# Patient Record
Sex: MALE | Race: ASIAN
[De-identification: names, ages, dates, MRNs, and addresses within clinical notes are randomized per-mention and may not be internally consistent; named-entity substitution may affect disease eponyms.]

---

## 2017-03-07 ENCOUNTER — MYAURORA ACCOUNT LINK (OUTPATIENT)
Dept: OTHER | Age: 77
End: 2017-03-07

## 2017-03-07 ENCOUNTER — PRIOR ORIGINAL RECORDS (OUTPATIENT)
Dept: OTHER | Age: 77
End: 2017-03-07

## 2017-03-21 ENCOUNTER — PRIOR ORIGINAL RECORDS (OUTPATIENT)
Dept: OTHER | Age: 77
End: 2017-03-21

## 2017-03-22 LAB
ALKALINE PHOSPHATATE(ALK PHOS): 54 IU/L
BILIRUBIN TOTAL: 1 MG/DL
BUN: 22 MG/DL
CALCIUM: 9.2 MG/DL
CHLORIDE: 106 MEQ/L
CHOLESTEROL, TOTAL: 117 MG/DL
CREATININE, SERUM: 1.23 MG/DL
GLUCOSE: 111 MG/DL
HDL CHOLESTEROL: 43 MG/DL
HEMATOCRIT: 45.9 %
HEMOGLOBIN A1C: 6.9 %
HEMOGLOBIN: 15.6 G/DL
LDL CHOLESTEROL: 57 MG/DL
PLATELETS: 191 K/UL
POTASSIUM, SERUM: 4.6 MEQ/L
PROTEIN, TOTAL: 6.6 G/DL
RED BLOOD COUNT: 4.64 X 10-6/U
SGOT (AST): 19 IU/L
SGPT (ALT): 17 IU/L
SODIUM: 138 MEQ/L
THYROID STIMULATING HORMONE: 3.52 MLU/L
TRIGLYCERIDES: 87 MG/DL
WHITE BLOOD COUNT: 7.4 X 10-3/U

## 2018-02-13 ENCOUNTER — TELEPHONE (OUTPATIENT)
Dept: FAMILY MEDICINE CLINIC | Facility: CLINIC | Age: 78
End: 2018-02-13

## 2018-02-13 PROBLEM — E78.2 MIXED HYPERLIPIDEMIA: Status: ACTIVE | Noted: 2018-02-13

## 2018-02-13 PROBLEM — I10 ESSENTIAL HYPERTENSION: Status: ACTIVE | Noted: 2018-02-13

## 2018-02-13 PROBLEM — I25.10 ATHEROSCLEROSIS OF NATIVE CORONARY ARTERY OF NATIVE HEART WITHOUT ANGINA PECTORIS: Status: ACTIVE | Noted: 2018-02-13

## 2018-02-14 ENCOUNTER — OFFICE VISIT (OUTPATIENT)
Dept: FAMILY MEDICINE CLINIC | Facility: CLINIC | Age: 78
End: 2018-02-14

## 2018-02-14 VITALS
TEMPERATURE: 97 F | HEART RATE: 60 BPM | HEIGHT: 67 IN | SYSTOLIC BLOOD PRESSURE: 102 MMHG | BODY MASS INDEX: 30.29 KG/M2 | DIASTOLIC BLOOD PRESSURE: 70 MMHG | WEIGHT: 193 LBS | RESPIRATION RATE: 14 BRPM

## 2018-02-14 DIAGNOSIS — R73.9 HYPERGLYCEMIA: ICD-10-CM

## 2018-02-14 DIAGNOSIS — I25.10 ATHEROSCLEROSIS OF NATIVE CORONARY ARTERY OF NATIVE HEART WITHOUT ANGINA PECTORIS: ICD-10-CM

## 2018-02-14 DIAGNOSIS — E78.2 MIXED HYPERLIPIDEMIA: ICD-10-CM

## 2018-02-14 DIAGNOSIS — I10 ESSENTIAL HYPERTENSION: Primary | ICD-10-CM

## 2018-02-14 PROCEDURE — 90732 PPSV23 VACC 2 YRS+ SUBQ/IM: CPT | Performed by: FAMILY MEDICINE

## 2018-02-14 PROCEDURE — 99204 OFFICE O/P NEW MOD 45 MIN: CPT | Performed by: FAMILY MEDICINE

## 2018-02-14 PROCEDURE — 90471 IMMUNIZATION ADMIN: CPT | Performed by: FAMILY MEDICINE

## 2018-02-14 RX ORDER — ATORVASTATIN CALCIUM 10 MG/1
10 TABLET, FILM COATED ORAL NIGHTLY
Qty: 90 TABLET | Refills: 3 | Status: SHIPPED | OUTPATIENT
Start: 2018-02-14 | End: 2019-02-16

## 2018-02-14 RX ORDER — LOSARTAN POTASSIUM 25 MG/1
25 TABLET ORAL DAILY
Qty: 90 TABLET | Refills: 3 | Status: SHIPPED | OUTPATIENT
Start: 2018-02-14 | End: 2019-02-16

## 2018-02-14 NOTE — PROGRESS NOTES
HPI:   Joshua Ramsay is a 69year old male who presents for recheck of his Pre-diabetes. We have been following this elevated Blood sugars and patieint is doing okay. Fasting blood sugars of the last few years are reviewed and mildly elevated.   He is a new Mixed hyperlipidemia; Atherosclerosis of native coronary artery of native heart without angina pectoris; and Hyperglycemia on his problem list.   He  has a past surgical history that includes cabg (2013). His family history is not on file.      He has a c Addressed This Visit        Cardiovascular    Essential hypertension - Primary    Overview     Losartan 25         Current Assessment & Plan     Stable, Continue present management.     Blood Pressure and Cardiac Medications          Losartan Potassium 25 M months  Return in about 6 months (around 8/14/2018) for recheck.     [facility], 2/14/2018, 2:46 PM

## 2018-02-14 NOTE — ASSESSMENT & PLAN NOTE
As for his Pre-Diabetes, it is well controlled, no significant medication side effects noted. Recommendations are: continue present meds, lose weight by increased dietary compliance and exercise and will check labs as ordered.     Lab Results  Component

## 2018-02-20 ENCOUNTER — APPOINTMENT (OUTPATIENT)
Dept: LAB | Age: 78
End: 2018-02-20
Attending: FAMILY MEDICINE
Payer: MEDICAID

## 2018-02-20 ENCOUNTER — PRIOR ORIGINAL RECORDS (OUTPATIENT)
Dept: OTHER | Age: 78
End: 2018-02-20

## 2018-02-20 DIAGNOSIS — I10 ESSENTIAL HYPERTENSION: ICD-10-CM

## 2018-02-20 DIAGNOSIS — E78.2 MIXED HYPERLIPIDEMIA: ICD-10-CM

## 2018-02-20 DIAGNOSIS — R73.9 HYPERGLYCEMIA: ICD-10-CM

## 2018-02-20 LAB
ALBUMIN SERPL-MCNC: 3.9 G/DL (ref 3.5–4.8)
ALP LIVER SERPL-CCNC: 60 U/L (ref 45–117)
ALT SERPL-CCNC: 29 U/L (ref 17–63)
AST SERPL-CCNC: 20 U/L (ref 15–41)
BILIRUB SERPL-MCNC: 1.2 MG/DL (ref 0.1–2)
BUN BLD-MCNC: 20 MG/DL (ref 8–20)
CALCIUM BLD-MCNC: 9.2 MG/DL (ref 8.3–10.3)
CHLORIDE: 108 MMOL/L (ref 101–111)
CHOLEST SMN-MCNC: 118 MG/DL (ref ?–200)
CO2: 24 MMOL/L (ref 22–32)
CREAT BLD-MCNC: 1.07 MG/DL (ref 0.7–1.3)
EST. AVERAGE GLUCOSE BLD GHB EST-MCNC: 151 MG/DL (ref 68–126)
GLUCOSE BLD-MCNC: 108 MG/DL (ref 70–99)
HBA1C MFR BLD HPLC: 6.9 % (ref ?–5.7)
HDLC SERPL-MCNC: 45 MG/DL (ref 45–?)
HDLC SERPL: 2.62 {RATIO} (ref ?–4.97)
LDLC SERPL CALC-MCNC: 59 MG/DL (ref ?–130)
M PROTEIN MFR SERPL ELPH: 7.3 G/DL (ref 6.1–8.3)
NONHDLC SERPL-MCNC: 73 MG/DL (ref ?–130)
POTASSIUM SERPL-SCNC: 4 MMOL/L (ref 3.6–5.1)
SODIUM SERPL-SCNC: 139 MMOL/L (ref 136–144)
TRIGL SERPL-MCNC: 72 MG/DL (ref ?–150)
VLDLC SERPL CALC-MCNC: 14 MG/DL (ref 5–40)

## 2018-02-20 PROCEDURE — 80053 COMPREHEN METABOLIC PANEL: CPT | Performed by: FAMILY MEDICINE

## 2018-02-20 PROCEDURE — 80061 LIPID PANEL: CPT | Performed by: FAMILY MEDICINE

## 2018-02-20 PROCEDURE — 83036 HEMOGLOBIN GLYCOSYLATED A1C: CPT | Performed by: FAMILY MEDICINE

## 2018-02-20 PROCEDURE — 36415 COLL VENOUS BLD VENIPUNCTURE: CPT | Performed by: FAMILY MEDICINE

## 2018-02-21 PROBLEM — E11.9 TYPE 2 DIABETES, DIET CONTROLLED (HCC): Status: ACTIVE | Noted: 2018-02-14

## 2018-02-21 LAB
ALKALINE PHOSPHATATE(ALK PHOS): 60 IU/L
BILIRUBIN TOTAL: 1.2 MG/DL
BUN: 20 MG/DL
CALCIUM: 9.2 MG/DL
CHLORIDE: 108 MEQ/L
CHOLESTEROL, TOTAL: 118 MG/DL
CREATININE, SERUM: 1.07 MG/DL
GLUCOSE: 108 MG/DL
HDL CHOLESTEROL: 45 MG/DL
HEMOGLOBIN A1C: 6.9 %
LDL CHOLESTEROL: 59 MG/DL
POTASSIUM, SERUM: 4 MEQ/L
PROTEIN, TOTAL: 7.3 G/DL
SGOT (AST): 20 IU/L
SGPT (ALT): 29 IU/L
SODIUM: 139 MEQ/L
TRIGLYCERIDES: 72 MG/DL

## 2018-03-02 ENCOUNTER — PRIOR ORIGINAL RECORDS (OUTPATIENT)
Dept: OTHER | Age: 78
End: 2018-03-02

## 2018-03-02 ENCOUNTER — MYAURORA ACCOUNT LINK (OUTPATIENT)
Dept: OTHER | Age: 78
End: 2018-03-02

## 2018-07-30 ENCOUNTER — TELEPHONE (OUTPATIENT)
Dept: FAMILY MEDICINE CLINIC | Facility: CLINIC | Age: 78
End: 2018-07-30

## 2018-07-30 NOTE — TELEPHONE ENCOUNTER
Nellie Rincon from [facility] patient care coordinator is calling to speak to nurse to go over patient's current medications and find out from Dr. Barrie Miller if any other patient care coordinator is needed.

## 2018-11-15 ENCOUNTER — IMMUNIZATION (OUTPATIENT)
Dept: FAMILY MEDICINE CLINIC | Facility: CLINIC | Age: 78
End: 2018-11-15
Payer: MEDICAID

## 2018-11-15 PROCEDURE — 90471 IMMUNIZATION ADMIN: CPT | Performed by: FAMILY MEDICINE

## 2018-11-15 PROCEDURE — 90653 IIV ADJUVANT VACCINE IM: CPT | Performed by: FAMILY MEDICINE

## 2019-02-14 ENCOUNTER — PRIOR ORIGINAL RECORDS (OUTPATIENT)
Dept: OTHER | Age: 79
End: 2019-02-14

## 2019-02-15 ENCOUNTER — LAB ENCOUNTER (OUTPATIENT)
Dept: LAB | Age: 79
End: 2019-02-15
Attending: INTERNAL MEDICINE
Payer: MEDICAID

## 2019-02-15 ENCOUNTER — PRIOR ORIGINAL RECORDS (OUTPATIENT)
Dept: OTHER | Age: 79
End: 2019-02-15

## 2019-02-15 DIAGNOSIS — E78.00 PURE HYPERCHOLESTEROLEMIA: Primary | ICD-10-CM

## 2019-02-15 DIAGNOSIS — E11.9 DM (DIABETES MELLITUS) (HCC): ICD-10-CM

## 2019-02-15 LAB
ALBUMIN SERPL-MCNC: 4 G/DL (ref 3.4–5)
ALBUMIN/GLOB SERPL: 1.1 {RATIO} (ref 1–2)
ALP LIVER SERPL-CCNC: 55 U/L (ref 45–117)
ALT SERPL-CCNC: 24 U/L (ref 16–61)
ANION GAP SERPL CALC-SCNC: 4 MMOL/L (ref 0–18)
AST SERPL-CCNC: 19 U/L (ref 15–37)
BILIRUB SERPL-MCNC: 0.9 MG/DL (ref 0.1–2)
BUN BLD-MCNC: 23 MG/DL (ref 7–18)
BUN/CREAT SERPL: 20.2 (ref 10–20)
CALCIUM BLD-MCNC: 8.9 MG/DL (ref 8.5–10.1)
CHLORIDE SERPL-SCNC: 106 MMOL/L (ref 98–107)
CHOLEST SMN-MCNC: 132 MG/DL (ref ?–200)
CO2 SERPL-SCNC: 30 MMOL/L (ref 21–32)
CREAT BLD-MCNC: 1.14 MG/DL (ref 0.7–1.3)
GLOBULIN PLAS-MCNC: 3.5 G/DL (ref 2.8–4.4)
GLUCOSE BLD-MCNC: 135 MG/DL (ref 70–99)
HDLC SERPL-MCNC: 46 MG/DL (ref 40–59)
LDLC SERPL CALC-MCNC: 66 MG/DL (ref ?–100)
M PROTEIN MFR SERPL ELPH: 7.5 G/DL (ref 6.4–8.2)
NONHDLC SERPL-MCNC: 86 MG/DL (ref ?–130)
OSMOLALITY SERPL CALC.SUM OF ELEC: 296 MOSM/KG (ref 275–295)
POTASSIUM SERPL-SCNC: 4.6 MMOL/L (ref 3.5–5.1)
SODIUM SERPL-SCNC: 140 MMOL/L (ref 136–145)
TRIGL SERPL-MCNC: 100 MG/DL (ref 30–149)
VLDLC SERPL CALC-MCNC: 20 MG/DL (ref 0–30)

## 2019-02-15 PROCEDURE — 80061 LIPID PANEL: CPT

## 2019-02-15 PROCEDURE — 36415 COLL VENOUS BLD VENIPUNCTURE: CPT

## 2019-02-15 PROCEDURE — 80053 COMPREHEN METABOLIC PANEL: CPT

## 2019-02-16 DIAGNOSIS — E78.2 MIXED HYPERLIPIDEMIA: ICD-10-CM

## 2019-02-16 DIAGNOSIS — I10 ESSENTIAL HYPERTENSION: ICD-10-CM

## 2019-02-19 ENCOUNTER — HOSPITAL ENCOUNTER (OUTPATIENT)
Dept: CV DIAGNOSTICS | Facility: HOSPITAL | Age: 79
Discharge: HOME | End: 2019-02-19
Attending: INTERNAL MEDICINE
Payer: MEDICAID

## 2019-02-19 ENCOUNTER — PRIOR ORIGINAL RECORDS (OUTPATIENT)
Dept: OTHER | Age: 79
End: 2019-02-19

## 2019-02-19 DIAGNOSIS — I35.1 AI (AORTIC INSUFFICIENCY): ICD-10-CM

## 2019-02-19 DIAGNOSIS — I25.10 CAD (CORONARY ARTERY DISEASE), NATIVE CORONARY ARTERY: ICD-10-CM

## 2019-02-19 LAB
ALKALINE PHOSPHATATE(ALK PHOS): 55 IU/L
BILIRUBIN TOTAL: 0.9 MG/DL
BUN: 23 MG/DL
CALCIUM: 8.9 MG/DL
CHLORIDE: 106 MEQ/L
CHOLESTEROL, TOTAL: 132 MG/DL
CREATININE, SERUM: 1.14 MG/DL
GLUCOSE: 135 MG/DL
HDL CHOLESTEROL: 46 MG/DL
LDL CHOLESTEROL: 66 MG/DL
POTASSIUM, SERUM: 4.6 MEQ/L
PROTEIN, TOTAL: 7.5 G/DL
SGOT (AST): 19 IU/L
SGPT (ALT): 24 IU/L
SODIUM: 140 MEQ/L
TRIGLYCERIDES: 100 MG/DL

## 2019-02-19 PROCEDURE — 93306 TTE W/DOPPLER COMPLETE: CPT | Performed by: INTERNAL MEDICINE

## 2019-02-20 NOTE — TELEPHONE ENCOUNTER
Refill requests for Atorvastatin and Losartan fail protocol because LOV was 2/14/18 and at that time, pt was advised to follow up in 6 months. No future appointments. Please call pt and assist him in scheduling appt hyperglycemia and HTN.     Routed

## 2019-02-20 NOTE — TELEPHONE ENCOUNTER
Appointment scheduled for 2/25 with Dr. Tatiana Guardado patient needs medication sent to pharmacy. Patient only has about 2days worth of medication.

## 2019-02-21 RX ORDER — LOSARTAN POTASSIUM 25 MG/1
TABLET ORAL
Qty: 90 TABLET | Refills: 0 | Status: SHIPPED | OUTPATIENT
Start: 2019-02-21 | End: 2019-02-25

## 2019-02-21 RX ORDER — ATORVASTATIN CALCIUM 10 MG/1
TABLET, FILM COATED ORAL
Qty: 90 TABLET | Refills: 0 | Status: SHIPPED | OUTPATIENT
Start: 2019-02-21 | End: 2019-02-25

## 2019-02-25 ENCOUNTER — OFFICE VISIT (OUTPATIENT)
Dept: FAMILY MEDICINE CLINIC | Facility: CLINIC | Age: 79
End: 2019-02-25
Payer: MEDICAID

## 2019-02-25 VITALS
DIASTOLIC BLOOD PRESSURE: 70 MMHG | RESPIRATION RATE: 12 BRPM | SYSTOLIC BLOOD PRESSURE: 118 MMHG | TEMPERATURE: 97 F | WEIGHT: 199 LBS | BODY MASS INDEX: 31.23 KG/M2 | HEIGHT: 67 IN | HEART RATE: 56 BPM

## 2019-02-25 DIAGNOSIS — I77.810 DILATATION OF THORACIC AORTA (HCC): ICD-10-CM

## 2019-02-25 DIAGNOSIS — E78.2 MIXED HYPERLIPIDEMIA: ICD-10-CM

## 2019-02-25 DIAGNOSIS — E11.9 TYPE 2 DIABETES, DIET CONTROLLED (HCC): ICD-10-CM

## 2019-02-25 DIAGNOSIS — I10 ESSENTIAL HYPERTENSION: Primary | ICD-10-CM

## 2019-02-25 LAB
CARTRIDGE LOT#: ABNORMAL NUMERIC
CREAT UR-SCNC: 69 MG/DL
HEMOGLOBIN A1C: 8 % (ref 4.3–5.6)
MICROALBUMIN UR-MCNC: 1.24 MG/DL
MICROALBUMIN/CREAT 24H UR-RTO: 18 UG/MG (ref ?–30)

## 2019-02-25 PROCEDURE — 82043 UR ALBUMIN QUANTITATIVE: CPT | Performed by: FAMILY MEDICINE

## 2019-02-25 PROCEDURE — 82570 ASSAY OF URINE CREATININE: CPT | Performed by: FAMILY MEDICINE

## 2019-02-25 PROCEDURE — 83036 HEMOGLOBIN GLYCOSYLATED A1C: CPT | Performed by: FAMILY MEDICINE

## 2019-02-25 PROCEDURE — 99214 OFFICE O/P EST MOD 30 MIN: CPT | Performed by: FAMILY MEDICINE

## 2019-02-25 RX ORDER — LOSARTAN POTASSIUM 25 MG/1
25 TABLET ORAL DAILY
Qty: 90 TABLET | Refills: 4 | Status: SHIPPED | OUTPATIENT
Start: 2019-02-25 | End: 2019-02-25

## 2019-02-25 RX ORDER — BLOOD SUGAR DIAGNOSTIC
STRIP MISCELLANEOUS
Qty: 100 STRIP | Refills: 4 | Status: SHIPPED | OUTPATIENT
Start: 2019-02-25 | End: 2019-08-29

## 2019-02-25 RX ORDER — ATORVASTATIN CALCIUM 10 MG/1
10 TABLET, FILM COATED ORAL DAILY
Qty: 90 TABLET | Refills: 4 | Status: SHIPPED | OUTPATIENT
Start: 2019-02-25 | End: 2020-02-28

## 2019-02-25 RX ORDER — ATORVASTATIN CALCIUM 10 MG/1
10 TABLET, FILM COATED ORAL DAILY
Qty: 90 TABLET | Refills: 4 | Status: SHIPPED | OUTPATIENT
Start: 2019-02-25 | End: 2019-02-25

## 2019-02-25 RX ORDER — LANCETS 30 GAUGE
EACH MISCELLANEOUS
Qty: 100 EACH | Refills: 4 | Status: SHIPPED | OUTPATIENT
Start: 2019-02-25 | End: 2020-08-28

## 2019-02-25 RX ORDER — BLOOD-GLUCOSE METER
1 EACH MISCELLANEOUS 2 TIMES DAILY
Qty: 1 KIT | Refills: 0 | COMMUNITY
Start: 2019-02-25 | End: 2019-08-29

## 2019-02-25 RX ORDER — LOSARTAN POTASSIUM 25 MG/1
25 TABLET ORAL DAILY
Qty: 90 TABLET | Refills: 4 | Status: SHIPPED | OUTPATIENT
Start: 2019-02-25 | End: 2020-02-28

## 2019-02-25 NOTE — PATIENT INSTRUCTIONS
[facility] Lab Encounter on 02/15/2019   Component Date Value Ref Range Status   • Glucose 02/15/2019 135* 70 - 99 mg/dL Final   • Sodium 02/15/2019 140  136 - 145 mmol/L Final   • Potassium 02/15/2019 4.6  3.5 - 5.1 mmol/L Final   • Chloride 02/15/2019 106  98 - HDL Chol 02/15/2019 86  <130 mg/dL Final      Desirable  <130 mg/dL   Borderline  130-159 mg/dL   High        160-189 mg/dL       Very high >=190 mg/dL

## 2019-02-25 NOTE — PROGRESS NOTES
HPI:   Patient presents with:  Blood Pressure    Carlos Esquivel is a 69year old male who presents for recheck of his diabetes.   Subjective    No revcent A1c, done to day and elevated at 8.1% with no nmeds  Lab Results   Component Value Date    A1C 6.9 (H) 02 Dorsalis pedis pulses are 2+ on the right side, and 2+ on the left side. Posterior tibial pulses are 2+ on the right side, and 2+ on the left side. Edema not present.   Pulmonary/Chest: Effort normal and breath sounds normal. No respiratory dist Assessment & Plan     Stable Continue present management.              Endocrine    Type 2 diabetes, diet controlled ([facility] [address]    Overview     Fasting blood sugar in the 1/21/1940 range with an A1c of 8.1% in March 2013 before CABG, A1c 6.9% 2/2017, no meds

## 2019-02-26 NOTE — ASSESSMENT & PLAN NOTE
As for his Diabetes, it is borderline controlled. Recommendations are: continue present meds, lose weight by increased dietary compliance and exercise and will check labs as ordered.   Therapuetic Lifestyle Change and start checking sugars, follow up in

## 2019-02-28 VITALS
HEART RATE: 58 BPM | HEIGHT: 68 IN | WEIGHT: 195 LBS | DIASTOLIC BLOOD PRESSURE: 80 MMHG | BODY MASS INDEX: 29.55 KG/M2 | SYSTOLIC BLOOD PRESSURE: 146 MMHG

## 2019-02-28 VITALS
BODY MASS INDEX: 29.98 KG/M2 | HEART RATE: 66 BPM | WEIGHT: 191 LBS | SYSTOLIC BLOOD PRESSURE: 110 MMHG | DIASTOLIC BLOOD PRESSURE: 70 MMHG | HEIGHT: 67 IN

## 2019-03-01 VITALS
DIASTOLIC BLOOD PRESSURE: 70 MMHG | WEIGHT: 199 LBS | SYSTOLIC BLOOD PRESSURE: 120 MMHG | HEART RATE: 78 BPM | BODY MASS INDEX: 31.23 KG/M2 | HEIGHT: 67 IN

## 2019-03-12 ENCOUNTER — TELEPHONE (OUTPATIENT)
Dept: FAMILY MEDICINE CLINIC | Facility: CLINIC | Age: 79
End: 2019-03-12

## 2019-03-12 NOTE — TELEPHONE ENCOUNTER
Called and talked to daughter the insurance company won't pay for the meter or the strips unless she is on a diabetic medication I suggested they try the $9 meter and 17 cent strips at [address] [facility].  She will look into this

## 2019-03-12 NOTE — TELEPHONE ENCOUNTER
Daughter states that pt prescribed Test strips and Lancets but Insurance won't cover because pt is not taking any Diabetes medication. Can Dr prescribe?  Carmen

## 2019-04-15 ENCOUNTER — TELEPHONE (OUTPATIENT)
Dept: FAMILY MEDICINE CLINIC | Facility: CLINIC | Age: 79
End: 2019-04-15

## 2019-04-15 NOTE — TELEPHONE ENCOUNTER
Received call from Jerie Kussmaul at [facility] checking on strips, states currently [facility] is covering the [facility] strips. Patient went to pharmacy and showing the strips sent in were not covered. [facility] called to verify strips.

## 2019-04-15 NOTE — TELEPHONE ENCOUNTER
Called Ayde Guevara [facility] to call back we did send in one touch strips for the patient so I do not understand the problem

## 2019-04-17 NOTE — TELEPHONE ENCOUNTER
Called and talked to Jerie Kussmaul explained what we ordered but patient is not on any diabetic meds diet controled will need prior Auth to get this approved

## 2019-04-23 RX ORDER — ASPIRIN 325 MG
TABLET ORAL
COMMUNITY

## 2019-04-23 RX ORDER — LOSARTAN POTASSIUM 25 MG/1
TABLET ORAL
COMMUNITY

## 2019-04-23 RX ORDER — ATORVASTATIN CALCIUM 10 MG/1
TABLET, FILM COATED ORAL
COMMUNITY

## 2019-05-20 DIAGNOSIS — I10 ESSENTIAL HYPERTENSION: ICD-10-CM

## 2019-05-20 DIAGNOSIS — E78.2 MIXED HYPERLIPIDEMIA: ICD-10-CM

## 2019-05-21 RX ORDER — ATORVASTATIN CALCIUM 10 MG/1
TABLET, FILM COATED ORAL
Qty: 90 TABLET | Refills: 1 | Status: SHIPPED | OUTPATIENT
Start: 2019-05-21 | End: 2020-02-28

## 2019-05-21 RX ORDER — LOSARTAN POTASSIUM 25 MG/1
TABLET ORAL
Qty: 90 TABLET | Refills: 1 | Status: SHIPPED | OUTPATIENT
Start: 2019-05-21 | End: 2020-02-28

## 2019-05-21 NOTE — TELEPHONE ENCOUNTER
Medication refilled per protocol.      Requested Prescriptions     Signed Prescriptions Disp Refills   • ATORVASTATIN 10 MG Oral Tab 90 tablet 1     Sig: TAKE 1 TABLET(10 MG) BY MOUTH EVERY NIGHT     Authorizing Provider: Francesca Meade     Ordering User: LAUREN

## 2019-08-29 ENCOUNTER — OFFICE VISIT (OUTPATIENT)
Dept: FAMILY MEDICINE CLINIC | Facility: CLINIC | Age: 79
End: 2019-08-29
Payer: MEDICAID

## 2019-08-29 VITALS
HEART RATE: 56 BPM | RESPIRATION RATE: 12 BRPM | TEMPERATURE: 97 F | DIASTOLIC BLOOD PRESSURE: 72 MMHG | WEIGHT: 196.81 LBS | SYSTOLIC BLOOD PRESSURE: 130 MMHG | HEIGHT: 67.5 IN | BODY MASS INDEX: 30.53 KG/M2

## 2019-08-29 DIAGNOSIS — I25.10 ATHEROSCLEROSIS OF NATIVE CORONARY ARTERY OF NATIVE HEART WITHOUT ANGINA PECTORIS: ICD-10-CM

## 2019-08-29 DIAGNOSIS — E11.65 TYPE 2 DIABETES MELLITUS WITH HYPERGLYCEMIA, WITHOUT LONG-TERM CURRENT USE OF INSULIN (HCC): Primary | ICD-10-CM

## 2019-08-29 DIAGNOSIS — I10 ESSENTIAL HYPERTENSION: ICD-10-CM

## 2019-08-29 DIAGNOSIS — E78.2 MIXED HYPERLIPIDEMIA: ICD-10-CM

## 2019-08-29 PROBLEM — E08.65 DIABETES MELLITUS DUE TO UNDERLYING CONDITION WITH HYPERGLYCEMIA (HCC): Status: ACTIVE | Noted: 2018-02-14

## 2019-08-29 LAB
CARTRIDGE LOT#: 989 NUMERIC
HEMOGLOBIN A1C: 8 % (ref 4.3–5.6)

## 2019-08-29 PROCEDURE — 83036 HEMOGLOBIN GLYCOSYLATED A1C: CPT | Performed by: FAMILY MEDICINE

## 2019-08-29 PROCEDURE — 99214 OFFICE O/P EST MOD 30 MIN: CPT | Performed by: FAMILY MEDICINE

## 2019-08-29 RX ORDER — BLOOD-GLUCOSE METER
1 EACH MISCELLANEOUS 2 TIMES DAILY
Qty: 1 KIT | Refills: 0 | Status: SHIPPED | OUTPATIENT
Start: 2019-08-29 | End: 2020-08-28

## 2019-08-29 RX ORDER — BLOOD SUGAR DIAGNOSTIC
STRIP MISCELLANEOUS
Qty: 300 STRIP | Refills: 3 | Status: SHIPPED | OUTPATIENT
Start: 2019-08-29 | End: 2020-08-28

## 2019-08-29 RX ORDER — METFORMIN HYDROCHLORIDE 500 MG/1
1000 TABLET, EXTENDED RELEASE ORAL
Qty: 180 TABLET | Refills: 3 | Status: SHIPPED | OUTPATIENT
Start: 2019-08-29 | End: 2020-02-28

## 2019-08-29 NOTE — PROGRESS NOTES
HPI:   Patient presents with:  Hypertension    Cheryl Washington is a 69year old male who presents for recheck of his diabetes. Subjective    Overall doing well but diabetes is unchanged since last time and still elevated with an A1c above 8.   He is been relat Cardiovascular: Normal rate, regular rhythm, S1 normal, S2 normal and normal heart sounds. No murmur heard. Pulses:       Dorsalis pedis pulses are 2+ on the right side, and 2+ on the left side.         Posterior tibial pulses are 2+ on the right side Atorvastatin 10         Current Assessment & Plan     Stable, Continue present management.     Cholesterol Lowering Medications          ATORVASTATIN 10 MG Oral Tab    atorvastatin 10 MG Oral Tab                    Endocrine    Type 2 diabetes mellitus with

## 2019-08-29 NOTE — ASSESSMENT & PLAN NOTE
Stable, Continue present management.     Cholesterol Lowering Medications          ATORVASTATIN 10 MG Oral Tab    atorvastatin 10 MG Oral Tab

## 2019-08-29 NOTE — ASSESSMENT & PLAN NOTE
Stable, Continue present management.     Blood Pressure and Cardiac Medications          LOSARTAN POTASSIUM 25 MG Oral Tab    Losartan Potassium 25 MG Oral Tab

## 2019-09-03 ENCOUNTER — TELEPHONE (OUTPATIENT)
Dept: FAMILY MEDICINE CLINIC | Facility: CLINIC | Age: 79
End: 2019-09-03

## 2019-09-03 DIAGNOSIS — E11.65 TYPE 2 DIABETES MELLITUS WITH HYPERGLYCEMIA, WITHOUT LONG-TERM CURRENT USE OF INSULIN (HCC): Primary | ICD-10-CM

## 2019-09-05 ENCOUNTER — TELEPHONE (OUTPATIENT)
Dept: FAMILY MEDICINE CLINIC | Facility: CLINIC | Age: 79
End: 2019-09-05

## 2019-09-05 DIAGNOSIS — E11.65 TYPE 2 DIABETES MELLITUS WITH HYPERGLYCEMIA, WITHOUT LONG-TERM CURRENT USE OF INSULIN (HCC): Primary | ICD-10-CM

## 2019-09-05 RX ORDER — BLOOD-GLUCOSE METER
EACH MISCELLANEOUS
Qty: 1 KIT | Refills: 0 | Status: SHIPPED | OUTPATIENT
Start: 2019-09-05 | End: 2020-08-28

## 2019-09-05 NOTE — TELEPHONE ENCOUNTER
Received call from [address] [facility] states received the lancets but also needs the device Onetouch Belica plus, did not receive order.

## 2019-09-10 ENCOUNTER — TELEPHONE (OUTPATIENT)
Dept: FAMILY MEDICINE CLINIC | Facility: CLINIC | Age: 79
End: 2019-09-10

## 2019-09-10 NOTE — TELEPHONE ENCOUNTER
[facility] pharmacy called requesting order sent to them for device.  Pharmacy only received lancets but no device

## 2019-09-11 RX ORDER — LANCING DEVICE/LANCETS
KIT MISCELLANEOUS
Qty: 1 EACH | Refills: 0 | Status: SHIPPED | OUTPATIENT
Start: 2019-09-11

## 2019-09-25 ENCOUNTER — TELEPHONE (OUTPATIENT)
Dept: CARDIOLOGY | Age: 79
End: 2019-09-25

## 2020-02-28 ENCOUNTER — OFFICE VISIT (OUTPATIENT)
Dept: FAMILY MEDICINE CLINIC | Facility: CLINIC | Age: 80
End: 2020-02-28
Payer: MEDICAID

## 2020-02-28 VITALS
SYSTOLIC BLOOD PRESSURE: 120 MMHG | TEMPERATURE: 98 F | BODY MASS INDEX: 30.53 KG/M2 | DIASTOLIC BLOOD PRESSURE: 70 MMHG | WEIGHT: 196.81 LBS | HEART RATE: 56 BPM | RESPIRATION RATE: 12 BRPM | HEIGHT: 67.5 IN

## 2020-02-28 DIAGNOSIS — Z00.00 ANNUAL PHYSICAL EXAM: ICD-10-CM

## 2020-02-28 DIAGNOSIS — I10 ESSENTIAL HYPERTENSION: ICD-10-CM

## 2020-02-28 DIAGNOSIS — E11.65 TYPE 2 DIABETES MELLITUS WITH HYPERGLYCEMIA, WITHOUT LONG-TERM CURRENT USE OF INSULIN (HCC): Primary | ICD-10-CM

## 2020-02-28 DIAGNOSIS — E78.2 MIXED HYPERLIPIDEMIA: ICD-10-CM

## 2020-02-28 DIAGNOSIS — I77.810 DILATATION OF THORACIC AORTA (HCC): ICD-10-CM

## 2020-02-28 DIAGNOSIS — I25.10 ATHEROSCLEROSIS OF NATIVE CORONARY ARTERY OF NATIVE HEART WITHOUT ANGINA PECTORIS: ICD-10-CM

## 2020-02-28 LAB
CARTRIDGE LOT#: 564 NUMERIC
HEMOGLOBIN A1C: 8 % (ref 4.3–5.6)

## 2020-02-28 PROCEDURE — 99397 PER PM REEVAL EST PAT 65+ YR: CPT | Performed by: FAMILY MEDICINE

## 2020-02-28 PROCEDURE — 83036 HEMOGLOBIN GLYCOSYLATED A1C: CPT | Performed by: FAMILY MEDICINE

## 2020-02-28 RX ORDER — ATORVASTATIN CALCIUM 10 MG/1
10 TABLET, FILM COATED ORAL DAILY
Qty: 90 TABLET | Refills: 4 | Status: SHIPPED | OUTPATIENT
Start: 2020-02-28 | End: 2020-06-04

## 2020-02-28 RX ORDER — LOSARTAN POTASSIUM 25 MG/1
25 TABLET ORAL DAILY
Qty: 90 TABLET | Refills: 4 | Status: SHIPPED | OUTPATIENT
Start: 2020-02-28 | End: 2020-06-04

## 2020-02-28 RX ORDER — METFORMIN HYDROCHLORIDE 500 MG/1
500 TABLET, EXTENDED RELEASE ORAL
Qty: 90 TABLET | Refills: 3 | Status: SHIPPED | OUTPATIENT
Start: 2020-02-28 | End: 2021-06-09

## 2020-02-28 RX ORDER — GLIPIZIDE 5 MG/1
5 TABLET, FILM COATED, EXTENDED RELEASE ORAL DAILY
Qty: 90 TABLET | Refills: 1 | Status: SHIPPED | OUTPATIENT
Start: 2020-02-28 | End: 2020-08-28

## 2020-03-25 ENCOUNTER — TELEPHONE (OUTPATIENT)
Dept: CARDIOLOGY | Age: 80
End: 2020-03-25

## 2020-03-30 PROBLEM — Z95.1 HX OF CABG: Status: ACTIVE | Noted: 2018-03-02

## 2020-03-30 PROBLEM — I35.1 AORTIC INSUFFICIENCY: Status: ACTIVE | Noted: 2018-03-02

## 2020-06-04 DIAGNOSIS — I10 ESSENTIAL HYPERTENSION: ICD-10-CM

## 2020-06-04 DIAGNOSIS — E78.2 MIXED HYPERLIPIDEMIA: ICD-10-CM

## 2020-06-04 RX ORDER — ATORVASTATIN CALCIUM 10 MG/1
TABLET, FILM COATED ORAL
Qty: 90 TABLET | Refills: 4 | Status: SHIPPED | OUTPATIENT
Start: 2020-06-04 | End: 2021-10-30

## 2020-06-04 RX ORDER — LOSARTAN POTASSIUM 25 MG/1
TABLET ORAL
Qty: 90 TABLET | Refills: 4 | Status: SHIPPED | OUTPATIENT
Start: 2020-06-04 | End: 2021-10-30

## 2020-06-25 ENCOUNTER — TELEPHONE (OUTPATIENT)
Dept: CARDIOLOGY | Age: 80
End: 2020-06-25

## 2020-08-27 PROBLEM — I35.1 AORTIC INSUFFICIENCY: Status: ACTIVE | Noted: 2018-03-02

## 2020-08-27 PROBLEM — Z95.1 HX OF CABG: Status: ACTIVE | Noted: 2018-03-02

## 2020-08-28 ENCOUNTER — OFFICE VISIT (OUTPATIENT)
Dept: FAMILY MEDICINE CLINIC | Facility: CLINIC | Age: 80
End: 2020-08-28
Payer: MEDICAID

## 2020-08-28 VITALS
TEMPERATURE: 98 F | HEIGHT: 67 IN | WEIGHT: 200 LBS | RESPIRATION RATE: 16 BRPM | SYSTOLIC BLOOD PRESSURE: 130 MMHG | DIASTOLIC BLOOD PRESSURE: 74 MMHG | HEART RATE: 64 BPM | BODY MASS INDEX: 31.39 KG/M2

## 2020-08-28 DIAGNOSIS — I35.1 NONRHEUMATIC AORTIC VALVE INSUFFICIENCY: ICD-10-CM

## 2020-08-28 DIAGNOSIS — I25.10 ATHEROSCLEROSIS OF NATIVE CORONARY ARTERY OF NATIVE HEART WITHOUT ANGINA PECTORIS: ICD-10-CM

## 2020-08-28 DIAGNOSIS — E11.65 TYPE 2 DIABETES MELLITUS WITH HYPERGLYCEMIA, WITHOUT LONG-TERM CURRENT USE OF INSULIN (HCC): ICD-10-CM

## 2020-08-28 DIAGNOSIS — I77.810 DILATATION OF THORACIC AORTA (HCC): ICD-10-CM

## 2020-08-28 DIAGNOSIS — I10 ESSENTIAL HYPERTENSION: ICD-10-CM

## 2020-08-28 DIAGNOSIS — Z00.00 ANNUAL PHYSICAL EXAM: Primary | ICD-10-CM

## 2020-08-28 DIAGNOSIS — E78.2 MIXED HYPERLIPIDEMIA: ICD-10-CM

## 2020-08-28 PROCEDURE — 3075F SYST BP GE 130 - 139MM HG: CPT | Performed by: FAMILY MEDICINE

## 2020-08-28 PROCEDURE — 3008F BODY MASS INDEX DOCD: CPT | Performed by: FAMILY MEDICINE

## 2020-08-28 PROCEDURE — 3078F DIAST BP <80 MM HG: CPT | Performed by: FAMILY MEDICINE

## 2020-08-28 PROCEDURE — 99397 PER PM REEVAL EST PAT 65+ YR: CPT | Performed by: FAMILY MEDICINE

## 2020-08-28 RX ORDER — GLIPIZIDE 5 MG/1
5 TABLET, FILM COATED, EXTENDED RELEASE ORAL DAILY
Qty: 90 TABLET | Refills: 1 | Status: SHIPPED | OUTPATIENT
Start: 2020-08-28 | End: 2021-03-01

## 2020-08-28 NOTE — ASSESSMENT & PLAN NOTE
As for his Diabetes, it is no significant medication side effects noted, borderline controlled. A1c today  Recommendations are: continue present meds, lose weight by increased dietary compliance and exercise and will check labs as ordered.     Lab Results

## 2020-08-28 NOTE — PATIENT INSTRUCTIONS
You can schedule this by calling Central Scheduling at [phone] or visit the online scheduling site at [email or web address]

## 2020-08-28 NOTE — PROGRESS NOTES
Corina Alicea is a 67year old male who presents for a complete physical exam.   HPI:   Patient presents with:  Physical      Annual Physical due on 02/28/2021      Pt complains of doing well. Stable sugars. No new issues. -120  .  Last A1c value was 02/15/2019 08:28 AM    BILT 0.9 02/15/2019 08:28 AM    TSH 3.690 03/12/2013 06:50 AM       Lab Results   Component Value Date/Time    CHOLEST 132 02/15/2019 08:28 AM    HDL 46 02/15/2019 08:28 AM    TRIG 100 02/15/2019 08:28 AM    LDL 66 02/15/2019 08:28 A completed. Pertinent positives and negatives noted in the the HPI.  Specifically:  GEN:  No fever or fatigue  HEAD:  No headaches  EYES:  No vision change  EARS:  No hearing loss  MOUTH/THROAT:  No sore throat or dental problems  HEART:  No chest pain or p tenderness. Abdominal: Soft. Bowel sounds are normal. He exhibits no distension. There is no hepatosplenomegaly. There is no tenderness. There is no rebound and no guarding. No hernia. Musculoskeletal: Normal range of motion.    Lymphadenopathy:     He ha Overview     Dr Bernice Hannah at [address], [address]     Stable continue present management          Atherosclerosis of native coronary artery of native heart without angina pectoris    Overview     [facility] managing, hx CABG. follow. Working on better Diabetes Mellitus control   Return in about 6 months (around 2/28/2021) for recheck.     Nisha Patino MD, 8/28/2020, 2:32 PM

## 2020-08-28 NOTE — ASSESSMENT & PLAN NOTE
Stable, Continue present management.     Blood Pressure and Cardiac Medications          LOSARTAN POTASSIUM 25 MG Oral Tab

## 2020-09-08 ENCOUNTER — LAB ENCOUNTER (OUTPATIENT)
Dept: LAB | Age: 80
End: 2020-09-08
Attending: FAMILY MEDICINE
Payer: MEDICAID

## 2020-09-08 DIAGNOSIS — E11.65 TYPE 2 DIABETES MELLITUS WITH HYPERGLYCEMIA, WITHOUT LONG-TERM CURRENT USE OF INSULIN (HCC): ICD-10-CM

## 2020-09-08 LAB
ALBUMIN SERPL-MCNC: 3.8 G/DL (ref 3.4–5)
ALBUMIN/GLOB SERPL: 1 {RATIO} (ref 1–2)
ALP LIVER SERPL-CCNC: 62 U/L (ref 45–117)
ALT SERPL-CCNC: 28 U/L (ref 16–61)
ANION GAP SERPL CALC-SCNC: 2 MMOL/L (ref 0–18)
AST SERPL-CCNC: 22 U/L (ref 15–37)
BILIRUB SERPL-MCNC: 1.1 MG/DL (ref 0.1–2)
BUN BLD-MCNC: 20 MG/DL (ref 7–18)
BUN/CREAT SERPL: 15.5 (ref 10–20)
CALCIUM BLD-MCNC: 9.6 MG/DL (ref 8.5–10.1)
CHLORIDE SERPL-SCNC: 107 MMOL/L (ref 98–112)
CHOLEST SMN-MCNC: 113 MG/DL (ref ?–200)
CO2 SERPL-SCNC: 29 MMOL/L (ref 21–32)
CREAT BLD-MCNC: 1.29 MG/DL (ref 0.7–1.3)
CREAT UR-SCNC: 113 MG/DL
EST. AVERAGE GLUCOSE BLD GHB EST-MCNC: 166 MG/DL (ref 68–126)
GLOBULIN PLAS-MCNC: 3.8 G/DL (ref 2.8–4.4)
GLUCOSE BLD-MCNC: 103 MG/DL (ref 70–99)
HBA1C MFR BLD HPLC: 7.4 % (ref ?–5.7)
HDLC SERPL-MCNC: 42 MG/DL (ref 40–59)
LDLC SERPL CALC-MCNC: 47 MG/DL (ref ?–100)
M PROTEIN MFR SERPL ELPH: 7.6 G/DL (ref 6.4–8.2)
MICROALBUMIN UR-MCNC: 1.8 MG/DL
MICROALBUMIN/CREAT 24H UR-RTO: 15.9 UG/MG (ref ?–30)
NONHDLC SERPL-MCNC: 71 MG/DL (ref ?–130)
OSMOLALITY SERPL CALC.SUM OF ELEC: 289 MOSM/KG (ref 275–295)
PATIENT FASTING Y/N/NP: YES
PATIENT FASTING Y/N/NP: YES
POTASSIUM SERPL-SCNC: 4.7 MMOL/L (ref 3.5–5.1)
SODIUM SERPL-SCNC: 138 MMOL/L (ref 136–145)
TRIGL SERPL-MCNC: 119 MG/DL (ref 30–149)
VLDLC SERPL CALC-MCNC: 24 MG/DL (ref 0–30)

## 2020-09-08 PROCEDURE — 82043 UR ALBUMIN QUANTITATIVE: CPT

## 2020-09-08 PROCEDURE — 80061 LIPID PANEL: CPT

## 2020-09-08 PROCEDURE — 83036 HEMOGLOBIN GLYCOSYLATED A1C: CPT

## 2020-09-08 PROCEDURE — 36415 COLL VENOUS BLD VENIPUNCTURE: CPT

## 2020-09-08 PROCEDURE — 82570 ASSAY OF URINE CREATININE: CPT

## 2020-09-08 PROCEDURE — 80053 COMPREHEN METABOLIC PANEL: CPT

## 2020-10-07 ENCOUNTER — TELEPHONE (OUTPATIENT)
Dept: FAMILY MEDICINE CLINIC | Facility: CLINIC | Age: 80
End: 2020-10-07

## 2020-10-07 NOTE — TELEPHONE ENCOUNTER
Spoke to pt's daughter, Antione Ferguson,  Both her parents who are pts are experiencing problems with getting heir medications refilled due to Impact issue. Informed pt I would call Carmen and provide them with the information needed to override these rejections.

## 2020-10-29 ENCOUNTER — OFFICE VISIT (OUTPATIENT)
Dept: CARDIOLOGY | Age: 80
End: 2020-10-29

## 2020-10-29 VITALS
WEIGHT: 201 LBS | DIASTOLIC BLOOD PRESSURE: 80 MMHG | SYSTOLIC BLOOD PRESSURE: 132 MMHG | HEART RATE: 56 BPM | BODY MASS INDEX: 30.46 KG/M2 | HEIGHT: 68 IN

## 2020-10-29 DIAGNOSIS — I25.10 CAD IN NATIVE ARTERY: Primary | ICD-10-CM

## 2020-10-29 DIAGNOSIS — Z95.1 HX OF CABG: ICD-10-CM

## 2020-10-29 DIAGNOSIS — E78.00 PURE HYPERCHOLESTEROLEMIA: ICD-10-CM

## 2020-10-29 PROCEDURE — 99215 OFFICE O/P EST HI 40 MIN: CPT | Performed by: INTERNAL MEDICINE

## 2020-10-29 RX ORDER — METFORMIN HYDROCHLORIDE 500 MG/1
TABLET, EXTENDED RELEASE ORAL DAILY
COMMUNITY
Start: 2020-10-13

## 2020-10-29 RX ORDER — GLIPIZIDE 5 MG/1
TABLET, FILM COATED, EXTENDED RELEASE ORAL DAILY
COMMUNITY
Start: 2020-09-09

## 2020-10-29 ASSESSMENT — PATIENT HEALTH QUESTIONNAIRE - PHQ9
CLINICAL INTERPRETATION OF PHQ9 SCORE: NO FURTHER SCREENING NEEDED
SUM OF ALL RESPONSES TO PHQ9 QUESTIONS 1 AND 2: 0
2. FEELING DOWN, DEPRESSED OR HOPELESS: NOT AT ALL
CLINICAL INTERPRETATION OF PHQ2 SCORE: NO FURTHER SCREENING NEEDED
1. LITTLE INTEREST OR PLEASURE IN DOING THINGS: NOT AT ALL
SUM OF ALL RESPONSES TO PHQ9 QUESTIONS 1 AND 2: 0

## 2021-02-01 DIAGNOSIS — Z23 NEED FOR VACCINATION: ICD-10-CM

## 2021-02-16 ENCOUNTER — TELEPHONE (OUTPATIENT)
Dept: FAMILY MEDICINE CLINIC | Facility: CLINIC | Age: 81
End: 2021-02-16

## 2021-02-16 RX ORDER — BLOOD SUGAR DIAGNOSTIC
1 STRIP MISCELLANEOUS 3 TIMES DAILY
Qty: 300 STRIP | Refills: 3 | Status: SHIPPED | OUTPATIENT
Start: 2021-02-16

## 2021-02-16 RX ORDER — LANCETS 33 GAUGE
1 EACH MISCELLANEOUS 3 TIMES DAILY
Qty: 300 EACH | Refills: 3 | Status: SHIPPED | OUTPATIENT
Start: 2021-02-16 | End: 2022-02-16

## 2021-02-16 RX ORDER — BLOOD SUGAR DIAGNOSTIC
1 STRIP MISCELLANEOUS 3 TIMES DAILY
COMMUNITY
Start: 2020-10-20 | End: 2021-02-16

## 2021-02-27 NOTE — ASSESSMENT & PLAN NOTE
As for his Diabetes, it is no significant medication side effects noted, borderline controlled.    Increase to glipzide 10 from 5  Recommendations are: continue present meds, lose weight by increased dietary compliance and exercise and will check labs as or

## 2021-03-01 ENCOUNTER — LAB ENCOUNTER (OUTPATIENT)
Dept: LAB | Age: 81
End: 2021-03-01
Attending: FAMILY MEDICINE
Payer: MEDICAID

## 2021-03-01 ENCOUNTER — OFFICE VISIT (OUTPATIENT)
Dept: FAMILY MEDICINE CLINIC | Facility: CLINIC | Age: 81
End: 2021-03-01
Payer: MEDICAID

## 2021-03-01 VITALS
HEIGHT: 67 IN | BODY MASS INDEX: 31.76 KG/M2 | DIASTOLIC BLOOD PRESSURE: 64 MMHG | RESPIRATION RATE: 16 BRPM | WEIGHT: 202.38 LBS | SYSTOLIC BLOOD PRESSURE: 126 MMHG | TEMPERATURE: 97 F | HEART RATE: 66 BPM

## 2021-03-01 DIAGNOSIS — I77.810 DILATATION OF THORACIC AORTA (HCC): ICD-10-CM

## 2021-03-01 DIAGNOSIS — I25.10 ATHEROSCLEROSIS OF NATIVE CORONARY ARTERY OF NATIVE HEART WITHOUT ANGINA PECTORIS: ICD-10-CM

## 2021-03-01 DIAGNOSIS — E78.2 MIXED HYPERLIPIDEMIA: ICD-10-CM

## 2021-03-01 DIAGNOSIS — E11.65 TYPE 2 DIABETES MELLITUS WITH HYPERGLYCEMIA, WITHOUT LONG-TERM CURRENT USE OF INSULIN (HCC): ICD-10-CM

## 2021-03-01 DIAGNOSIS — E11.65 TYPE 2 DIABETES MELLITUS WITH HYPERGLYCEMIA, WITHOUT LONG-TERM CURRENT USE OF INSULIN (HCC): Primary | ICD-10-CM

## 2021-03-01 DIAGNOSIS — I10 ESSENTIAL HYPERTENSION: ICD-10-CM

## 2021-03-01 LAB
ALBUMIN SERPL-MCNC: 3.8 G/DL (ref 3.4–5)
ALBUMIN/GLOB SERPL: 1.1 {RATIO} (ref 1–2)
ALP LIVER SERPL-CCNC: 63 U/L
ALT SERPL-CCNC: 22 U/L
ANION GAP SERPL CALC-SCNC: 6 MMOL/L (ref 0–18)
AST SERPL-CCNC: 13 U/L (ref 15–37)
BILIRUB SERPL-MCNC: 1 MG/DL (ref 0.1–2)
BUN BLD-MCNC: 14 MG/DL (ref 7–18)
BUN/CREAT SERPL: 12.5 (ref 10–20)
CALCIUM BLD-MCNC: 9.6 MG/DL (ref 8.5–10.1)
CHLORIDE SERPL-SCNC: 108 MMOL/L (ref 98–112)
CHOLEST SMN-MCNC: 134 MG/DL (ref ?–200)
CO2 SERPL-SCNC: 27 MMOL/L (ref 21–32)
CREAT BLD-MCNC: 1.12 MG/DL
EST. AVERAGE GLUCOSE BLD GHB EST-MCNC: 183 MG/DL (ref 68–126)
GLOBULIN PLAS-MCNC: 3.6 G/DL (ref 2.8–4.4)
GLUCOSE BLD-MCNC: 123 MG/DL (ref 70–99)
HBA1C MFR BLD HPLC: 8 % (ref ?–5.7)
HDLC SERPL-MCNC: 41 MG/DL (ref 40–59)
LDLC SERPL CALC-MCNC: 51 MG/DL (ref ?–100)
M PROTEIN MFR SERPL ELPH: 7.4 G/DL (ref 6.4–8.2)
NONHDLC SERPL-MCNC: 93 MG/DL (ref ?–130)
OSMOLALITY SERPL CALC.SUM OF ELEC: 294 MOSM/KG (ref 275–295)
PATIENT FASTING Y/N/NP: YES
PATIENT FASTING Y/N/NP: YES
POTASSIUM SERPL-SCNC: 4.4 MMOL/L (ref 3.5–5.1)
SODIUM SERPL-SCNC: 141 MMOL/L (ref 136–145)
TRIGL SERPL-MCNC: 209 MG/DL (ref 30–149)
VLDLC SERPL CALC-MCNC: 42 MG/DL (ref 0–30)

## 2021-03-01 PROCEDURE — 3074F SYST BP LT 130 MM HG: CPT | Performed by: FAMILY MEDICINE

## 2021-03-01 PROCEDURE — 3008F BODY MASS INDEX DOCD: CPT | Performed by: FAMILY MEDICINE

## 2021-03-01 PROCEDURE — 3078F DIAST BP <80 MM HG: CPT | Performed by: FAMILY MEDICINE

## 2021-03-01 PROCEDURE — 99214 OFFICE O/P EST MOD 30 MIN: CPT | Performed by: FAMILY MEDICINE

## 2021-03-01 PROCEDURE — 36415 COLL VENOUS BLD VENIPUNCTURE: CPT

## 2021-03-01 PROCEDURE — 80053 COMPREHEN METABOLIC PANEL: CPT

## 2021-03-01 PROCEDURE — 80061 LIPID PANEL: CPT

## 2021-03-01 PROCEDURE — 83036 HEMOGLOBIN GLYCOSYLATED A1C: CPT

## 2021-03-01 RX ORDER — GLIPIZIDE 10 MG/1
10 TABLET, FILM COATED, EXTENDED RELEASE ORAL DAILY
Qty: 90 TABLET | Refills: 1 | Status: SHIPPED | OUTPATIENT
Start: 2021-03-01 | End: 2021-09-14

## 2021-03-01 NOTE — PROGRESS NOTES
had concerns including Annual (6 mos follow up ). Christiano Fitzpatrick is a 79year old male who presents for recheck of his diabetes. Subjective    Increased sugars, no new issues  Last A1c value was 8% done 3/1/2021.      Diabetes Care Dilated Eye Exam due on 0 Abdominal:      General: Bowel sounds are normal.      Palpations: Abdomen is soft. Musculoskeletal:      Right foot: Normal range of motion. Feet:      Right foot:      Protective Sensation: 6 sites tested. 6 sites sensed.       Skin integrity: Skin with hyperglycemia ([facility] [address] - Primary    Overview     Fasting blood sugar in the 1[birth year]21/1940 range with an A1c of 8.1% in March 2013 before CABG, A1c 6.9% 2/2017, no meds  Metformin  daily and glipizide ER 10 increased 3/1/2021,  started 8.2019, and 2.202

## 2021-03-03 DIAGNOSIS — E11.65 TYPE 2 DIABETES MELLITUS WITH HYPERGLYCEMIA, WITHOUT LONG-TERM CURRENT USE OF INSULIN (HCC): ICD-10-CM

## 2021-03-03 RX ORDER — GLIPIZIDE 5 MG/1
TABLET, FILM COATED, EXTENDED RELEASE ORAL
Qty: 90 TABLET | Refills: 1 | OUTPATIENT
Start: 2021-03-03

## 2021-03-03 NOTE — TELEPHONE ENCOUNTER
I recently bumped him from 5 to 10 mg dose.   Please notify them to cancel this 5 mg dose as I tried to send a message to stop it before

## 2021-03-03 NOTE — TELEPHONE ENCOUNTER
Requested Prescriptions     Pending Prescriptions Disp Refills   • GLIPIZIDE ER 5 MG Oral Tablet 24 Hr [Pharmacy Med Name: GLIPIZIDE ER 5MG TABLETS] 90 tablet 1     Sig: TAKE 1 TABLET(5 MG) BY MOUTH DAILY     LOV 3/1/2021     Patient was asked to follow-up

## 2021-06-01 DIAGNOSIS — E11.65 TYPE 2 DIABETES MELLITUS WITH HYPERGLYCEMIA, WITHOUT LONG-TERM CURRENT USE OF INSULIN (HCC): ICD-10-CM

## 2021-06-08 NOTE — TELEPHONE ENCOUNTER
Requested Prescriptions     Pending Prescriptions Disp Refills   • METFORMIN HCL  MG Oral Tablet 24 Hr [Pharmacy Med Name: METFORMIN ER 500MG 24HR TABS] 90 tablet 3     Sig: TAKE 1 TABLET(500 MG) BY MOUTH DAILY WITH BREAKFAST     LOV 3/1/2021     Pat

## 2021-06-09 RX ORDER — METFORMIN HYDROCHLORIDE 500 MG/1
TABLET, EXTENDED RELEASE ORAL
Qty: 90 TABLET | Refills: 3 | Status: SHIPPED | OUTPATIENT
Start: 2021-06-09

## 2021-09-01 ENCOUNTER — OFFICE VISIT (OUTPATIENT)
Dept: FAMILY MEDICINE CLINIC | Facility: CLINIC | Age: 81
End: 2021-09-01
Payer: MEDICAID

## 2021-09-01 VITALS
HEIGHT: 67 IN | SYSTOLIC BLOOD PRESSURE: 132 MMHG | HEART RATE: 70 BPM | DIASTOLIC BLOOD PRESSURE: 64 MMHG | RESPIRATION RATE: 14 BRPM | WEIGHT: 194.63 LBS | BODY MASS INDEX: 30.55 KG/M2

## 2021-09-01 DIAGNOSIS — Z00.00 ANNUAL PHYSICAL EXAM: Primary | ICD-10-CM

## 2021-09-01 DIAGNOSIS — R39.14 BENIGN PROSTATIC HYPERPLASIA WITH INCOMPLETE BLADDER EMPTYING: ICD-10-CM

## 2021-09-01 DIAGNOSIS — I10 ESSENTIAL HYPERTENSION: ICD-10-CM

## 2021-09-01 DIAGNOSIS — E78.2 MIXED HYPERLIPIDEMIA: ICD-10-CM

## 2021-09-01 DIAGNOSIS — N40.1 BENIGN PROSTATIC HYPERPLASIA WITH INCOMPLETE BLADDER EMPTYING: ICD-10-CM

## 2021-09-01 DIAGNOSIS — I25.10 ATHEROSCLEROSIS OF NATIVE CORONARY ARTERY OF NATIVE HEART WITHOUT ANGINA PECTORIS: ICD-10-CM

## 2021-09-01 DIAGNOSIS — I35.1 NONRHEUMATIC AORTIC VALVE INSUFFICIENCY: ICD-10-CM

## 2021-09-01 DIAGNOSIS — E11.65 TYPE 2 DIABETES MELLITUS WITH HYPERGLYCEMIA, WITHOUT LONG-TERM CURRENT USE OF INSULIN (HCC): ICD-10-CM

## 2021-09-01 DIAGNOSIS — I77.810 DILATATION OF THORACIC AORTA (HCC): ICD-10-CM

## 2021-09-01 PROCEDURE — 3078F DIAST BP <80 MM HG: CPT | Performed by: FAMILY MEDICINE

## 2021-09-01 PROCEDURE — 99397 PER PM REEVAL EST PAT 65+ YR: CPT | Performed by: FAMILY MEDICINE

## 2021-09-01 PROCEDURE — 3008F BODY MASS INDEX DOCD: CPT | Performed by: FAMILY MEDICINE

## 2021-09-01 PROCEDURE — 3075F SYST BP GE 130 - 139MM HG: CPT | Performed by: FAMILY MEDICINE

## 2021-09-01 RX ORDER — TAMSULOSIN HYDROCHLORIDE 0.4 MG/1
0.4 CAPSULE ORAL DAILY
Qty: 90 CAPSULE | Refills: 1 | Status: SHIPPED | OUTPATIENT
Start: 2021-09-01

## 2021-09-01 NOTE — PROGRESS NOTES
Mu Patton is a 79year old male who presents for a complete physical exam.     had concerns including Physical (annual ) and Urinary (could only hold urine only up to 5 minutes when going ).    His last annual assessment has been over 1 year: Annual Physi time   Diabetes Care Dilated Eye Exam Never done  Zoster Vaccines(1 of 2) Never done  COVID-19 Vaccine(2 - Pfizer 2-dose series) due on 02/24/2021  Diabetes Care A1C due on 06/01/2021  Fall Risk Screening due on 08/28/2021  Annual Depression Screen due on Not on file      Years of education: Not on file      Highest education level: Not on file    Occupational History      Occupation: Retired    Tobacco Use      Smoking status: Former Smoker        Quit date: 3/6/1974        Years since quittin.5 effort is normal.      Breath sounds: Normal breath sounds. Abdominal:      General: Abdomen is flat. Bowel sounds are normal. There is no distension. Palpations: Abdomen is soft. Musculoskeletal:         General: Normal range of motion.       Cerv LOSARTAN POTASSIUM 25 MG Oral Tab          3. Mixed hyperlipidemia  Overview:  Atorvastatin 10  Assessment & Plan:  Stable, Continue present management. Cholesterol Lowering Medications          ATORVASTATIN 10 MG Oral Tab          4.  Atherosclerosis of total) by mouth daily. Dispense: 90 capsule; Refill: 1      I am having Andrae Calderón start on tamsulosin.  I am also having him maintain his aspirin, OneTouch Verio IQ System, OneTouch Delica Lancing Dev, atorvastatin, Losartan Potassium, OneTouch Verio, On

## 2021-09-10 ENCOUNTER — TELEPHONE (OUTPATIENT)
Dept: FAMILY MEDICINE CLINIC | Facility: CLINIC | Age: 81
End: 2021-09-10

## 2021-09-10 DIAGNOSIS — E11.65 TYPE 2 DIABETES MELLITUS WITH HYPERGLYCEMIA, WITHOUT LONG-TERM CURRENT USE OF INSULIN (HCC): ICD-10-CM

## 2021-09-14 RX ORDER — GLIPIZIDE 10 MG/1
TABLET, FILM COATED, EXTENDED RELEASE ORAL
Qty: 90 TABLET | Refills: 3 | Status: SHIPPED | OUTPATIENT
Start: 2021-09-14

## 2021-09-14 NOTE — TELEPHONE ENCOUNTER
Pharmacist states pt is out of medication and he was already given an emergency refill over the weekend.

## 2021-10-19 ENCOUNTER — APPOINTMENT (OUTPATIENT)
Dept: CARDIOLOGY | Age: 81
End: 2021-10-19

## 2021-10-29 DIAGNOSIS — E78.2 MIXED HYPERLIPIDEMIA: ICD-10-CM

## 2021-10-29 DIAGNOSIS — I10 ESSENTIAL HYPERTENSION: ICD-10-CM

## 2021-10-30 RX ORDER — ATORVASTATIN CALCIUM 10 MG/1
TABLET, FILM COATED ORAL
Qty: 90 TABLET | Refills: 4 | Status: SHIPPED | OUTPATIENT
Start: 2021-10-30

## 2021-10-30 RX ORDER — LOSARTAN POTASSIUM 25 MG/1
TABLET ORAL
Qty: 90 TABLET | Refills: 4 | Status: SHIPPED | OUTPATIENT
Start: 2021-10-30

## 2021-10-30 NOTE — TELEPHONE ENCOUNTER
Requested Prescriptions     Pending Prescriptions Disp Refills   • LOSARTAN 25 MG Oral Tab [Pharmacy Med Name: LOSARTAN 25MG TABLETS] 90 tablet 4     Sig: TAKE 1 TABLET(25 MG) BY MOUTH DAILY   • ATORVASTATIN 10 MG Oral Tab [Pharmacy Med Name: ATORVASTATIN

## 2021-11-22 ENCOUNTER — LAB ENCOUNTER (OUTPATIENT)
Dept: LAB | Age: 81
End: 2021-11-22
Attending: FAMILY MEDICINE
Payer: MEDICAID

## 2021-11-22 DIAGNOSIS — E11.65 TYPE 2 DIABETES MELLITUS WITH HYPERGLYCEMIA, WITHOUT LONG-TERM CURRENT USE OF INSULIN (HCC): ICD-10-CM

## 2021-11-22 PROCEDURE — 80053 COMPREHEN METABOLIC PANEL: CPT

## 2021-11-22 PROCEDURE — 83036 HEMOGLOBIN GLYCOSYLATED A1C: CPT

## 2021-11-22 PROCEDURE — 80061 LIPID PANEL: CPT

## 2021-12-13 PROBLEM — H26.9 CATARACTS, BILATERAL: Status: ACTIVE | Noted: 2021-12-13

## 2022-02-09 ENCOUNTER — TELEPHONE (OUTPATIENT)
Dept: FAMILY MEDICINE CLINIC | Facility: CLINIC | Age: 82
End: 2022-02-09

## 2022-02-09 NOTE — TELEPHONE ENCOUNTER
Patient having Cataract Extraction with IOL - both eyes. Left eye 05/12/22  Right eye 05/26/22  With Dr. Marine Elmore. H&P required, patient scheduled with Dr. Mahesh Ortiz 05/06/22. Fax H&P to [phone].       Fax in [address]

## 2022-02-25 RX ORDER — TAMSULOSIN HYDROCHLORIDE 0.4 MG/1
CAPSULE ORAL
Qty: 90 CAPSULE | Refills: 3 | Status: SHIPPED | OUTPATIENT
Start: 2022-02-25

## 2022-03-01 NOTE — ASSESSMENT & PLAN NOTE
As for his Diabetes, it is reasonably well controlled, no significant medication side effects noted. Some hyperglycemia    Recommendations are: continue present meds, lose weight by increased dietary compliance and exercise and will check labs as ordered.     Lab Results   Component Value Date    A1C 7.2 (H) 11/22/2021    A1C 8.0 (H) 03/01/2021      Blood Sugar Medications          GLIPIZIDE 10 MG Oral Tablet 24 Hr    METFORMIN HCL  MG Oral Tablet 24 Hr

## 2022-03-01 NOTE — ASSESSMENT & PLAN NOTE
Stable, Continue present management.     Blood Pressure and Cardiac Medications          LOSARTAN 25 MG Oral Tab

## 2022-03-02 ENCOUNTER — OFFICE VISIT (OUTPATIENT)
Dept: FAMILY MEDICINE CLINIC | Facility: CLINIC | Age: 82
End: 2022-03-02
Payer: MEDICAID

## 2022-03-02 ENCOUNTER — TELEPHONE (OUTPATIENT)
Dept: FAMILY MEDICINE CLINIC | Facility: CLINIC | Age: 82
End: 2022-03-02

## 2022-03-02 VITALS
DIASTOLIC BLOOD PRESSURE: 72 MMHG | WEIGHT: 197.81 LBS | HEIGHT: 67 IN | BODY MASS INDEX: 31.05 KG/M2 | SYSTOLIC BLOOD PRESSURE: 126 MMHG | HEART RATE: 72 BPM | RESPIRATION RATE: 18 BRPM

## 2022-03-02 DIAGNOSIS — I10 ESSENTIAL HYPERTENSION: ICD-10-CM

## 2022-03-02 DIAGNOSIS — I25.10 ATHEROSCLEROSIS OF NATIVE CORONARY ARTERY OF NATIVE HEART WITHOUT ANGINA PECTORIS: ICD-10-CM

## 2022-03-02 DIAGNOSIS — E11.65 TYPE 2 DIABETES MELLITUS WITH HYPERGLYCEMIA, WITHOUT LONG-TERM CURRENT USE OF INSULIN (HCC): Primary | ICD-10-CM

## 2022-03-02 DIAGNOSIS — E78.2 MIXED HYPERLIPIDEMIA: ICD-10-CM

## 2022-03-02 DIAGNOSIS — I35.1 NONRHEUMATIC AORTIC VALVE INSUFFICIENCY: ICD-10-CM

## 2022-03-02 PROBLEM — N18.31 CHRONIC KIDNEY DISEASE, STAGE 3A (HCC): Status: ACTIVE | Noted: 2022-03-02

## 2022-03-02 LAB
CARTRIDGE LOT#: 889 NUMERIC
HEMOGLOBIN A1C: 7.5 % (ref 4.3–5.6)

## 2022-03-02 PROCEDURE — 3074F SYST BP LT 130 MM HG: CPT | Performed by: FAMILY MEDICINE

## 2022-03-02 PROCEDURE — 99214 OFFICE O/P EST MOD 30 MIN: CPT | Performed by: FAMILY MEDICINE

## 2022-03-02 PROCEDURE — 83036 HEMOGLOBIN GLYCOSYLATED A1C: CPT | Performed by: FAMILY MEDICINE

## 2022-03-02 PROCEDURE — 3008F BODY MASS INDEX DOCD: CPT | Performed by: FAMILY MEDICINE

## 2022-03-02 PROCEDURE — 3078F DIAST BP <80 MM HG: CPT | Performed by: FAMILY MEDICINE

## 2022-03-02 RX ORDER — KETOROLAC TROMETHAMINE 4 MG/ML
1 SOLUTION/ DROPS OPHTHALMIC 4 TIMES DAILY
COMMUNITY
Start: 2022-02-03

## 2022-03-02 RX ORDER — OFLOXACIN 3 MG/ML
SOLUTION/ DROPS OPHTHALMIC
COMMUNITY
Start: 2022-02-04

## 2022-03-02 RX ORDER — PREDNISOLONE ACETATE 10 MG/ML
1 SUSPENSION/ DROPS OPHTHALMIC 4 TIMES DAILY
COMMUNITY
Start: 2022-02-03

## 2022-03-02 NOTE — TELEPHONE ENCOUNTER
Please enter lab orders for the patient's upcoming physical appointment. Physical scheduled: Your appointments     Date & Time Appointment Department [facility])    Sep 02, 2022  2:30 PM CDT Physical - Established with Mu Ray MD [facility], [address], [address], [place]  ([address])            [address] [place] [address] [place] [address] [phone]         Preferred lab: [facility]     The patient has been notified to complete fasting labs prior to their physical appointment.

## 2022-04-08 ENCOUNTER — TELEPHONE (OUTPATIENT)
Dept: FAMILY MEDICINE CLINIC | Facility: CLINIC | Age: 82
End: 2022-04-08

## 2022-04-08 NOTE — TELEPHONE ENCOUNTER
Last refill given was 6/9/21 #90 with 3 refills  Spoke with pharmacy- they cancelled refill by accident and will fix and refill med

## 2022-05-05 LAB
ABSOLUTE BASOPHILS: 19 CELLS/UL (ref 0–200)
ABSOLUTE EOSINOPHILS: 229 CELLS/UL (ref 15–500)
ABSOLUTE LYMPHOCYTES: 1252 CELLS/UL (ref 850–3900)
ABSOLUTE MONOCYTES: 515 CELLS/UL (ref 200–950)
ABSOLUTE NEUTROPHILS: 4185 CELLS/UL (ref 1500–7800)
ALBUMIN/GLOBULIN RATIO: 1.6 (CALC) (ref 1–2.5)
ALBUMIN: 4.3 G/DL (ref 3.6–5.1)
ALKALINE PHOSPHATASE: 60 U/L (ref 35–144)
ALT: 27 U/L (ref 9–46)
AST: 25 U/L (ref 10–35)
BASOPHILS: 0.3 %
BILIRUBIN, TOTAL: 0.7 MG/DL (ref 0.2–1.2)
BUN/CREATININE RATIO: 16 (CALC) (ref 6–22)
BUN: 19 MG/DL (ref 7–25)
CALCIUM: 10 MG/DL (ref 8.6–10.3)
CARBON DIOXIDE: 28 MMOL/L (ref 20–32)
CHLORIDE: 105 MMOL/L (ref 98–110)
CHOL/HDLC RATIO: 2.9 (CALC)
CHOLESTEROL, TOTAL: 112 MG/DL
CREATININE, RANDOM URINE: 127 MG/DL (ref 20–320)
CREATININE: 1.2 MG/DL (ref 0.7–1.11)
EGFR IF AFRICN AM: 66 ML/MIN/1.73M2
EGFR IF NONAFRICN AM: 57 ML/MIN/1.73M2
EOSINOPHILS: 3.7 %
GLOBULIN: 2.7 G/DL (CALC) (ref 1.9–3.7)
GLUCOSE: 97 MG/DL (ref 65–99)
HDL CHOLESTEROL: 38 MG/DL
HEMATOCRIT: 44 % (ref 38.5–50)
HEMOGLOBIN A1C: 7 % OF TOTAL HGB
HEMOGLOBIN: 15 G/DL (ref 13.2–17.1)
LDL-CHOLESTEROL: 55 MG/DL (CALC)
LYMPHOCYTES: 20.2 %
MCH: 33.5 PG (ref 27–33)
MCHC: 34.1 G/DL (ref 32–36)
MCV: 98.2 FL (ref 80–100)
MICROALBUMIN/CREATININE RATIO, RANDOM URINE: 7 MCG/MG CREAT
MICROALBUMIN: 0.9 MG/DL
MONOCYTES: 8.3 %
MPV: 10.1 FL (ref 7.5–12.5)
NEUTROPHILS: 67.5 %
NON-HDL CHOLESTEROL: 74 MG/DL (CALC)
PLATELET COUNT: 201 THOUSAND/UL (ref 140–400)
POTASSIUM: 5 MMOL/L (ref 3.5–5.3)
PROTEIN, TOTAL: 7 G/DL (ref 6.1–8.1)
RDW: 13.1 % (ref 11–15)
RED BLOOD CELL COUNT: 4.48 MILLION/UL (ref 4.2–5.8)
SODIUM: 140 MMOL/L (ref 135–146)
TRIGLYCERIDES: 102 MG/DL
TSH W/REFLEX TO FT4: 4.22 MIU/L (ref 0.4–4.5)
WHITE BLOOD CELL COUNT: 6.2 THOUSAND/UL (ref 3.8–10.8)

## 2022-05-05 NOTE — ASSESSMENT & PLAN NOTE
Lab Results   Component Value Date/Time    CREATSERUM 1.20 (H) 05/04/2022 08:28 AM    GFR 62 09/05/2016 10:33 AM    GFRNAA 57 (L) 05/04/2022 08:28 AM      Stable, continue present management

## 2022-05-05 NOTE — ASSESSMENT & PLAN NOTE
As for his Diabetes, it is well controlled, no significant medication side effects noted. Recommendations are: continue present meds, lose weight by increased dietary compliance and exercise and will check labs as ordered.     Lab Results   Component Value Date    A1C 7.0 (H) 05/04/2022    A1C 7.5 (A) 03/02/2022      Blood Sugar Medications          GLIPIZIDE 10 MG Oral Tablet 24 Hr    METFORMIN HCL  MG Oral Tablet 24 Hr

## 2022-05-06 ENCOUNTER — OFFICE VISIT (OUTPATIENT)
Dept: FAMILY MEDICINE CLINIC | Facility: CLINIC | Age: 82
End: 2022-05-06
Payer: MEDICAID

## 2022-05-06 VITALS
DIASTOLIC BLOOD PRESSURE: 60 MMHG | BODY MASS INDEX: 30.44 KG/M2 | RESPIRATION RATE: 18 BRPM | HEIGHT: 68 IN | SYSTOLIC BLOOD PRESSURE: 126 MMHG | WEIGHT: 200.81 LBS | HEART RATE: 78 BPM

## 2022-05-06 DIAGNOSIS — N18.31 CHRONIC KIDNEY DISEASE, STAGE 3A (HCC): ICD-10-CM

## 2022-05-06 DIAGNOSIS — I25.10 ATHEROSCLEROSIS OF NATIVE CORONARY ARTERY OF NATIVE HEART WITHOUT ANGINA PECTORIS: ICD-10-CM

## 2022-05-06 DIAGNOSIS — I10 ESSENTIAL HYPERTENSION: ICD-10-CM

## 2022-05-06 DIAGNOSIS — E11.65 TYPE 2 DIABETES MELLITUS WITH HYPERGLYCEMIA, WITHOUT LONG-TERM CURRENT USE OF INSULIN (HCC): ICD-10-CM

## 2022-05-06 DIAGNOSIS — Z01.818 PREOPERATIVE CLEARANCE: ICD-10-CM

## 2022-05-06 DIAGNOSIS — E78.2 MIXED HYPERLIPIDEMIA: ICD-10-CM

## 2022-05-06 DIAGNOSIS — H25.013 CORTICAL AGE-RELATED CATARACT OF BOTH EYES: Primary | ICD-10-CM

## 2022-05-06 DIAGNOSIS — I77.810 DILATATION OF THORACIC AORTA (HCC): ICD-10-CM

## 2022-05-06 PROCEDURE — 93000 ELECTROCARDIOGRAM COMPLETE: CPT | Performed by: FAMILY MEDICINE

## 2022-05-06 PROCEDURE — 3078F DIAST BP <80 MM HG: CPT | Performed by: FAMILY MEDICINE

## 2022-05-06 PROCEDURE — 3008F BODY MASS INDEX DOCD: CPT | Performed by: FAMILY MEDICINE

## 2022-05-06 PROCEDURE — 99243 OFF/OP CNSLTJ NEW/EST LOW 30: CPT | Performed by: FAMILY MEDICINE

## 2022-05-06 PROCEDURE — 3074F SYST BP LT 130 MM HG: CPT | Performed by: FAMILY MEDICINE

## 2022-07-24 ENCOUNTER — HOSPITAL ENCOUNTER (OUTPATIENT)
Age: 82
Discharge: HOME | End: 2022-07-24
Attending: EMERGENCY MEDICINE
Payer: MEDICAID

## 2022-07-24 VITALS
DIASTOLIC BLOOD PRESSURE: 56 MMHG | RESPIRATION RATE: 14 BRPM | HEART RATE: 54 BPM | TEMPERATURE: 98 F | OXYGEN SATURATION: 97 % | SYSTOLIC BLOOD PRESSURE: 141 MMHG

## 2022-07-24 DIAGNOSIS — U07.1 COVID-19: Primary | ICD-10-CM

## 2022-07-24 LAB — SARS-COV-2 RNA RESP QL NAA+PROBE: DETECTED

## 2022-07-24 PROCEDURE — 99203 OFFICE O/P NEW LOW 30 MIN: CPT

## 2022-07-24 PROCEDURE — 99202 OFFICE O/P NEW SF 15 MIN: CPT

## 2022-07-24 NOTE — ED INITIAL ASSESSMENT (HPI)
Began having sore throat yesterday. Tested positive for COVID-19 at home yesterday. Denies cough, fever, SOB or chest pain.

## 2022-08-16 DIAGNOSIS — E11.65 TYPE 2 DIABETES MELLITUS WITH HYPERGLYCEMIA, WITHOUT LONG-TERM CURRENT USE OF INSULIN (HCC): ICD-10-CM

## 2022-08-16 RX ORDER — GLIPIZIDE 10 MG/1
TABLET, FILM COATED, EXTENDED RELEASE ORAL
Qty: 90 TABLET | Refills: 1 | Status: SHIPPED | OUTPATIENT
Start: 2022-08-16

## 2022-09-01 NOTE — ASSESSMENT & PLAN NOTE
As for his Diabetes, it is well controlled, no significant medication side effects noted. Recommendations are: continue present meds, lose weight by increased dietary compliance and exercise and will check labs as ordered.     Lab Results   Component Value Date    A1C 7.0 (H) 05/04/2022    A1C 7.5 (A) 03/02/2022      Blood Sugar Medications          GLIPIZIDE ER 10 MG Oral Tablet 24 Hr    METFORMIN HCL  MG Oral Tablet 24 Hr

## 2022-09-02 ENCOUNTER — OFFICE VISIT (OUTPATIENT)
Dept: FAMILY MEDICINE CLINIC | Facility: CLINIC | Age: 82
End: 2022-09-02
Payer: MEDICAID

## 2022-09-02 VITALS
BODY MASS INDEX: 29.37 KG/M2 | HEIGHT: 68 IN | SYSTOLIC BLOOD PRESSURE: 130 MMHG | DIASTOLIC BLOOD PRESSURE: 60 MMHG | WEIGHT: 193.81 LBS | HEART RATE: 70 BPM | RESPIRATION RATE: 18 BRPM

## 2022-09-02 DIAGNOSIS — E03.8 SUBCLINICAL HYPOTHYROIDISM: ICD-10-CM

## 2022-09-02 DIAGNOSIS — Z95.1 HX OF CABG: ICD-10-CM

## 2022-09-02 DIAGNOSIS — I25.10 ATHEROSCLEROSIS OF NATIVE CORONARY ARTERY OF NATIVE HEART WITHOUT ANGINA PECTORIS: ICD-10-CM

## 2022-09-02 DIAGNOSIS — E11.65 TYPE 2 DIABETES MELLITUS WITH HYPERGLYCEMIA, WITHOUT LONG-TERM CURRENT USE OF INSULIN (HCC): ICD-10-CM

## 2022-09-02 DIAGNOSIS — I77.810 DILATATION OF THORACIC AORTA (HCC): ICD-10-CM

## 2022-09-02 DIAGNOSIS — E78.2 MIXED HYPERLIPIDEMIA: ICD-10-CM

## 2022-09-02 DIAGNOSIS — I35.1 NONRHEUMATIC AORTIC VALVE INSUFFICIENCY: ICD-10-CM

## 2022-09-02 DIAGNOSIS — Z00.00 ANNUAL PHYSICAL EXAM: Primary | ICD-10-CM

## 2022-09-02 DIAGNOSIS — I10 ESSENTIAL HYPERTENSION: ICD-10-CM

## 2022-09-02 DIAGNOSIS — N18.31 CHRONIC KIDNEY DISEASE, STAGE 3A (HCC): ICD-10-CM

## 2022-09-02 PROCEDURE — 99397 PER PM REEVAL EST PAT 65+ YR: CPT | Performed by: FAMILY MEDICINE

## 2022-09-02 PROCEDURE — 3008F BODY MASS INDEX DOCD: CPT | Performed by: FAMILY MEDICINE

## 2022-09-02 PROCEDURE — 3075F SYST BP GE 130 - 139MM HG: CPT | Performed by: FAMILY MEDICINE

## 2022-09-02 PROCEDURE — 3078F DIAST BP <80 MM HG: CPT | Performed by: FAMILY MEDICINE

## 2022-11-09 DIAGNOSIS — E11.65 TYPE 2 DIABETES MELLITUS WITH HYPERGLYCEMIA, WITHOUT LONG-TERM CURRENT USE OF INSULIN (HCC): ICD-10-CM

## 2022-11-11 RX ORDER — METFORMIN HYDROCHLORIDE 500 MG/1
TABLET, EXTENDED RELEASE ORAL
Qty: 90 TABLET | Refills: 0 | Status: SHIPPED | OUTPATIENT
Start: 2022-11-11

## 2023-01-28 DIAGNOSIS — I10 ESSENTIAL HYPERTENSION: ICD-10-CM

## 2023-01-31 RX ORDER — LOSARTAN POTASSIUM 25 MG/1
TABLET ORAL
Qty: 90 TABLET | Refills: 4 | Status: SHIPPED | OUTPATIENT
Start: 2023-01-31

## 2023-02-24 DIAGNOSIS — E11.65 TYPE 2 DIABETES MELLITUS WITH HYPERGLYCEMIA, WITHOUT LONG-TERM CURRENT USE OF INSULIN (HCC): ICD-10-CM

## 2023-02-24 RX ORDER — GLIPIZIDE 10 MG/1
TABLET, FILM COATED, EXTENDED RELEASE ORAL
Qty: 90 TABLET | Refills: 1 | Status: SHIPPED | OUTPATIENT
Start: 2023-02-24

## 2023-03-02 NOTE — ASSESSMENT & PLAN NOTE
As for his Diabetes, it is well controlled, no significant medication side effects noted. Recommendations are: continue present meds, lose weight by increased dietary compliance and exercise and will check labs as ordered.     Lab Results   Component Value Date    A1C 7.0 (H) 05/04/2022    A1C 7.5 (A) 03/02/2022      Blood Sugar Medications          GLIPIZIDE ER 10 MG Oral Tablet 24 Hr    METFORMIN  MG Oral Tablet 24 Hr

## 2023-03-02 NOTE — ASSESSMENT & PLAN NOTE
Stable, Continue present management.     Thyroid  (most recent labs)   Lab Results   Component Value Date/Time    TSH 3.690 03/12/2013 06:50 AM    TSHT4 4.22 05/04/2022 08:28 AM

## 2023-03-03 ENCOUNTER — OFFICE VISIT (OUTPATIENT)
Dept: FAMILY MEDICINE CLINIC | Facility: CLINIC | Age: 83
End: 2023-03-03
Payer: MEDICAID

## 2023-03-03 VITALS
HEIGHT: 67 IN | DIASTOLIC BLOOD PRESSURE: 60 MMHG | HEART RATE: 80 BPM | BODY MASS INDEX: 29.29 KG/M2 | SYSTOLIC BLOOD PRESSURE: 130 MMHG | RESPIRATION RATE: 18 BRPM | WEIGHT: 186.63 LBS

## 2023-03-03 DIAGNOSIS — E78.2 MIXED HYPERLIPIDEMIA: ICD-10-CM

## 2023-03-03 DIAGNOSIS — I25.10 ATHEROSCLEROSIS OF NATIVE CORONARY ARTERY OF NATIVE HEART WITHOUT ANGINA PECTORIS: ICD-10-CM

## 2023-03-03 DIAGNOSIS — E03.8 SUBCLINICAL HYPOTHYROIDISM: ICD-10-CM

## 2023-03-03 DIAGNOSIS — N40.1 BENIGN PROSTATIC HYPERPLASIA WITH INCOMPLETE BLADDER EMPTYING: ICD-10-CM

## 2023-03-03 DIAGNOSIS — R39.14 BENIGN PROSTATIC HYPERPLASIA WITH INCOMPLETE BLADDER EMPTYING: ICD-10-CM

## 2023-03-03 DIAGNOSIS — I35.1 NONRHEUMATIC AORTIC VALVE INSUFFICIENCY: ICD-10-CM

## 2023-03-03 DIAGNOSIS — I77.810 DILATATION OF THORACIC AORTA (HCC): ICD-10-CM

## 2023-03-03 DIAGNOSIS — Z95.1 HX OF CABG: ICD-10-CM

## 2023-03-03 DIAGNOSIS — N18.31 CHRONIC KIDNEY DISEASE, STAGE 3A (HCC): ICD-10-CM

## 2023-03-03 DIAGNOSIS — E11.65 TYPE 2 DIABETES MELLITUS WITH HYPERGLYCEMIA, WITHOUT LONG-TERM CURRENT USE OF INSULIN (HCC): ICD-10-CM

## 2023-03-03 DIAGNOSIS — I10 ESSENTIAL HYPERTENSION: Primary | ICD-10-CM

## 2023-03-03 LAB
CARTRIDGE LOT#: 536 NUMERIC
HEMOGLOBIN A1C: 6.8 % (ref 4.3–5.6)

## 2023-03-03 PROCEDURE — 90471 IMMUNIZATION ADMIN: CPT | Performed by: FAMILY MEDICINE

## 2023-03-03 PROCEDURE — 90677 PCV20 VACCINE IM: CPT | Performed by: FAMILY MEDICINE

## 2023-03-03 PROCEDURE — 3008F BODY MASS INDEX DOCD: CPT | Performed by: FAMILY MEDICINE

## 2023-03-03 PROCEDURE — 3078F DIAST BP <80 MM HG: CPT | Performed by: FAMILY MEDICINE

## 2023-03-03 PROCEDURE — 83036 HEMOGLOBIN GLYCOSYLATED A1C: CPT | Performed by: FAMILY MEDICINE

## 2023-03-03 PROCEDURE — 99214 OFFICE O/P EST MOD 30 MIN: CPT | Performed by: FAMILY MEDICINE

## 2023-03-03 PROCEDURE — 3075F SYST BP GE 130 - 139MM HG: CPT | Performed by: FAMILY MEDICINE

## 2023-03-03 RX ORDER — FINASTERIDE 5 MG/1
5 TABLET, FILM COATED ORAL DAILY
Qty: 90 TABLET | Refills: 3 | Status: SHIPPED | OUTPATIENT
Start: 2023-03-03 | End: 2024-02-26

## 2023-03-03 RX ORDER — BLOOD SUGAR DIAGNOSTIC
1 STRIP MISCELLANEOUS 2 TIMES DAILY
Qty: 200 STRIP | Refills: 3 | Status: SHIPPED | OUTPATIENT
Start: 2023-03-03

## 2023-03-03 RX ORDER — TAMSULOSIN HYDROCHLORIDE 0.4 MG/1
0.4 CAPSULE ORAL 2 TIMES DAILY
Qty: 90 CAPSULE | Refills: 3 | Status: SHIPPED | OUTPATIENT
Start: 2023-03-03

## 2023-03-03 RX ORDER — METFORMIN HYDROCHLORIDE 500 MG/1
500 TABLET, EXTENDED RELEASE ORAL
Qty: 90 TABLET | Refills: 3 | Status: SHIPPED | OUTPATIENT
Start: 2023-03-03

## 2023-07-26 DIAGNOSIS — E78.2 MIXED HYPERLIPIDEMIA: ICD-10-CM

## 2023-07-28 RX ORDER — ATORVASTATIN CALCIUM 10 MG/1
10 TABLET, FILM COATED ORAL DAILY
Qty: 90 TABLET | Refills: 3 | Status: SHIPPED | OUTPATIENT
Start: 2023-07-28

## 2023-07-28 NOTE — TELEPHONE ENCOUNTER
Requested Prescriptions     Pending Prescriptions Disp Refills    ATORVASTATIN 10 MG Oral Tab [Pharmacy Med Name: ATORVASTATIN 10MG TABLETS] 90 tablet 0     Sig: TAKE 1 TABLET BY MOUTH DAILY     LOV 3/3/2023     Patient was asked to follow-up in: 6 months    Appointment scheduled: 9/8/2023 Jose Rodriguez MD     Unable to refill per protocol. No recent lipid.

## 2023-08-19 DIAGNOSIS — E11.65 TYPE 2 DIABETES MELLITUS WITH HYPERGLYCEMIA, WITHOUT LONG-TERM CURRENT USE OF INSULIN (HCC): ICD-10-CM

## 2023-08-19 DIAGNOSIS — R39.14 BENIGN PROSTATIC HYPERPLASIA WITH INCOMPLETE BLADDER EMPTYING: ICD-10-CM

## 2023-08-19 DIAGNOSIS — N40.1 BENIGN PROSTATIC HYPERPLASIA WITH INCOMPLETE BLADDER EMPTYING: ICD-10-CM

## 2023-08-24 DIAGNOSIS — E11.65 TYPE 2 DIABETES MELLITUS WITH HYPERGLYCEMIA, WITHOUT LONG-TERM CURRENT USE OF INSULIN (HCC): ICD-10-CM

## 2023-08-24 DIAGNOSIS — N40.1 BENIGN PROSTATIC HYPERPLASIA WITH INCOMPLETE BLADDER EMPTYING: ICD-10-CM

## 2023-08-24 DIAGNOSIS — R39.14 BENIGN PROSTATIC HYPERPLASIA WITH INCOMPLETE BLADDER EMPTYING: ICD-10-CM

## 2023-08-24 RX ORDER — TAMSULOSIN HYDROCHLORIDE 0.4 MG/1
0.4 CAPSULE ORAL 2 TIMES DAILY
Qty: 180 CAPSULE | Refills: 4 | Status: SHIPPED | OUTPATIENT
Start: 2023-08-24

## 2023-08-24 RX ORDER — GLIPIZIDE 10 MG/1
10 TABLET, FILM COATED, EXTENDED RELEASE ORAL DAILY
Qty: 90 TABLET | Refills: 4 | Status: SHIPPED | OUTPATIENT
Start: 2023-08-24

## 2023-08-24 NOTE — TELEPHONE ENCOUNTER
LOV 3/3/2023    Future Appointments   Date Time Provider Herman Moreisti   9/8/2023  3:00 PM Sylvia Kunz MD [facility]      Tamsulosin pended to Dr. Edgar Simpson for review (not on protocol)

## 2023-08-25 RX ORDER — TAMSULOSIN HYDROCHLORIDE 0.4 MG/1
0.4 CAPSULE ORAL 2 TIMES DAILY
Qty: 90 CAPSULE | Refills: 3 | OUTPATIENT
Start: 2023-08-25

## 2023-08-25 RX ORDER — GLIPIZIDE 10 MG/1
TABLET, FILM COATED, EXTENDED RELEASE ORAL
Qty: 90 TABLET | Refills: 1 | OUTPATIENT
Start: 2023-08-25

## 2023-08-25 NOTE — TELEPHONE ENCOUNTER
TAMSULOSIN 0.4MG CAPSULES     The original prescription was reordered on 8/24/2023 by Cecelia Robertson MD. Renewing this prescription may not be appropriate.      Possible duplicate: Hover to review recent actions on this medication    Sig: TAKE 1 CAPSULE(0.4 MG) BY MOUTH TWICE DAILY    Disp: 90 capsule    Refills: 3 (Pharmacy requested: Not specified)    Start: 8/19/2023    Class: Normal    For: Benign prostatic hyperplasia with incomplete bladder emptying    Last ordered: 5 months ago by Jeannette Wetzel MD Last refill: 5/25/202

## 2023-08-25 NOTE — TELEPHONE ENCOUNTER
GLIPIZIDE ER 10MG TABLETS     The original prescription was reordered on 8/24/2023 by Shaila Ortiz MD. Renewing this prescription may not be appropriate. Possible duplicate: Hover to review recent actions on this medication    Sig: TAKE 1 TABLET(10 MG) BY MOUTH DAILY    Disp: 90 tablet    Refills: 1 (Pharmacy requested: Not specified)    Start: 8/19/2023    Class: Normal    Last ordered: 6 months ago by Olu Erwin MD Last refill: 5/25/2023    Rx #: [id number]    Name from pharmacy: TAMSULOSIN 0.4MG CAPSULES    The original prescription was reordered on 8/24/2023 by Shaila Ortiz MD. Michelle Apple this prescription may not be appropriate.      Possible duplicate: Hover to review recent actions on this medication    Sig: TAKE 1 CAPSULE(0.4 MG) BY MOUTH TWICE DAILY    Disp: 90 capsule    Refills: 3 (Pharmacy requested: Not specified)

## 2023-09-07 NOTE — ASSESSMENT & PLAN NOTE
A1c is 7.5 done 9/8/2023 which shows hyperglycemia and borderline control, maintain vigilance and increase activity and medications as listed. Diabetic medications include glipiZIDE ER 10 MG Oral Tablet 24 Hr [[id number]], metFORMIN  MG Oral Tablet 24 Hr [[id number]].  Poor control, will work on [email or web address] 190 repeat in 1 month labs

## 2023-09-07 NOTE — ASSESSMENT & PLAN NOTE
Cholesterol shows Good control. Long term heart-healthy diet and lifestyle discussed and encouraged to reduce risk of cardiovascular disease. Cholesterol: 112, done on 5/4/2022. HDL Cholesterol: 38, done on 5/4/2022. TriGlycerides 102, done on 5/4/2022. LDL Cholesterol: 55, done on 5/4/2022. Cholesterol medications include atorvastatin 10 MG Oral Tab [[id number]].

## 2023-09-07 NOTE — ASSESSMENT & PLAN NOTE
BP shows good control with last BP of 130/60. Continue lifestyle changes, diet, exercise and weight loss. Last K was 5 done on 5/4/2022. Last Cr was 1.2 done on 5/4/2022. Last GFR (NENA) was 57 done on 5/4/2022. Hypertension meds include LOSARTAN 25 MG Oral Tab [[id number]].

## 2023-09-08 ENCOUNTER — OFFICE VISIT (OUTPATIENT)
Dept: FAMILY MEDICINE CLINIC | Facility: CLINIC | Age: 83
End: 2023-09-08
Payer: MEDICAID

## 2023-09-08 VITALS
DIASTOLIC BLOOD PRESSURE: 78 MMHG | RESPIRATION RATE: 16 BRPM | SYSTOLIC BLOOD PRESSURE: 134 MMHG | HEART RATE: 76 BPM | WEIGHT: 185.63 LBS | HEIGHT: 67 IN | BODY MASS INDEX: 29.13 KG/M2

## 2023-09-08 DIAGNOSIS — I10 ESSENTIAL HYPERTENSION: Primary | ICD-10-CM

## 2023-09-08 DIAGNOSIS — N18.31 CHRONIC KIDNEY DISEASE, STAGE 3A (HCC): ICD-10-CM

## 2023-09-08 DIAGNOSIS — E11.65 TYPE 2 DIABETES MELLITUS WITH HYPERGLYCEMIA, WITHOUT LONG-TERM CURRENT USE OF INSULIN (HCC): ICD-10-CM

## 2023-09-08 DIAGNOSIS — E78.2 MIXED HYPERLIPIDEMIA: ICD-10-CM

## 2023-09-08 DIAGNOSIS — I77.810 DILATATION OF THORACIC AORTA (HCC): ICD-10-CM

## 2023-09-08 DIAGNOSIS — E03.8 SUBCLINICAL HYPOTHYROIDISM: ICD-10-CM

## 2023-09-08 LAB
CARTRIDGE LOT#: 611 NUMERIC
HEMOGLOBIN A1C: 7.5 % (ref 4.3–5.6)

## 2023-09-08 PROCEDURE — 3078F DIAST BP <80 MM HG: CPT | Performed by: FAMILY MEDICINE

## 2023-09-08 PROCEDURE — 99214 OFFICE O/P EST MOD 30 MIN: CPT | Performed by: FAMILY MEDICINE

## 2023-09-08 PROCEDURE — 3008F BODY MASS INDEX DOCD: CPT | Performed by: FAMILY MEDICINE

## 2023-09-08 PROCEDURE — 83036 HEMOGLOBIN GLYCOSYLATED A1C: CPT | Performed by: FAMILY MEDICINE

## 2023-09-08 PROCEDURE — 3075F SYST BP GE 130 - 139MM HG: CPT | Performed by: FAMILY MEDICINE

## 2023-09-08 RX ORDER — LOSARTAN POTASSIUM 25 MG/1
25 TABLET ORAL DAILY
Qty: 90 TABLET | Refills: 4 | Status: SHIPPED | OUTPATIENT
Start: 2023-09-08

## 2023-10-11 ENCOUNTER — TELEPHONE (OUTPATIENT)
Dept: FAMILY MEDICINE CLINIC | Facility: CLINIC | Age: 83
End: 2023-10-11

## 2023-10-11 DIAGNOSIS — Z12.11 ENCOUNTER FOR SCREENING COLONOSCOPY: Primary | ICD-10-CM

## 2023-10-11 DIAGNOSIS — R10.9 ABDOMINAL DISCOMFORT: ICD-10-CM

## 2023-10-27 LAB
ABSOLUTE BASOPHILS: 20 CELLS/UL (ref 0–200)
ABSOLUTE EOSINOPHILS: 292 CELLS/UL (ref 15–500)
ABSOLUTE LYMPHOCYTES: 1163 CELLS/UL (ref 850–3900)
ABSOLUTE MONOCYTES: 612 CELLS/UL (ref 200–950)
ABSOLUTE NEUTROPHILS: 4712 CELLS/UL (ref 1500–7800)
ALBUMIN/GLOBULIN RATIO: 1.6 (CALC) (ref 1–2.5)
ALBUMIN: 4.4 G/DL (ref 3.6–5.1)
ALKALINE PHOSPHATASE: 55 U/L (ref 35–144)
ALT: 20 U/L (ref 9–46)
AST: 19 U/L (ref 10–35)
BASOPHILS: 0.3 %
BILIRUBIN, TOTAL: 0.9 MG/DL (ref 0.2–1.2)
BUN/CREATININE RATIO: 17 (CALC) (ref 6–22)
BUN: 26 MG/DL (ref 7–25)
CALCIUM: 10.2 MG/DL (ref 8.6–10.3)
CARBON DIOXIDE: 26 MMOL/L (ref 20–32)
CHLORIDE: 106 MMOL/L (ref 98–110)
CHOL/HDLC RATIO: 3.3 (CALC)
CHOLESTEROL, TOTAL: 127 MG/DL
CREATININE, RANDOM URINE: 78 MG/DL (ref 20–320)
CREATININE: 1.51 MG/DL (ref 0.7–1.22)
EGFR: 46 ML/MIN/1.73M2
EOSINOPHILS: 4.3 %
GLOBULIN: 2.7 G/DL (CALC) (ref 1.9–3.7)
GLUCOSE: 117 MG/DL (ref 65–99)
HDL CHOLESTEROL: 39 MG/DL
HEMATOCRIT: 41.6 % (ref 38.5–50)
HEMOGLOBIN A1C: 7.2 % OF TOTAL HGB
HEMOGLOBIN: 14 G/DL (ref 13.2–17.1)
LDL-CHOLESTEROL: 66 MG/DL (CALC)
LYMPHOCYTES: 17.1 %
MCH: 32.6 PG (ref 27–33)
MCHC: 33.7 G/DL (ref 32–36)
MCV: 97 FL (ref 80–100)
MICROALBUMIN/CREATININE RATIO, RANDOM URINE: 12 MCG/MG CREAT
MICROALBUMIN: 0.9 MG/DL
MONOCYTES: 9 %
MPV: 10.3 FL (ref 7.5–12.5)
NEUTROPHILS: 69.3 %
NON-HDL CHOLESTEROL: 88 MG/DL (CALC)
PLATELET COUNT: 163 THOUSAND/UL (ref 140–400)
POTASSIUM: 4.8 MMOL/L (ref 3.5–5.3)
PROTEIN, TOTAL: 7.1 G/DL (ref 6.1–8.1)
RDW: 13 % (ref 11–15)
RED BLOOD CELL COUNT: 4.29 MILLION/UL (ref 4.2–5.8)
SODIUM: 139 MMOL/L (ref 135–146)
TRIGLYCERIDES: 134 MG/DL
TSH W/REFLEX TO FT4: 2.58 MIU/L (ref 0.4–4.5)
WHITE BLOOD CELL COUNT: 6.8 THOUSAND/UL (ref 3.8–10.8)

## 2024-02-26 DIAGNOSIS — R39.14 BENIGN PROSTATIC HYPERPLASIA WITH INCOMPLETE BLADDER EMPTYING: ICD-10-CM

## 2024-02-26 DIAGNOSIS — E11.65 TYPE 2 DIABETES MELLITUS WITH HYPERGLYCEMIA, WITHOUT LONG-TERM CURRENT USE OF INSULIN (HCC): ICD-10-CM

## 2024-02-26 DIAGNOSIS — N40.1 BENIGN PROSTATIC HYPERPLASIA WITH INCOMPLETE BLADDER EMPTYING: ICD-10-CM

## 2024-03-06 RX ORDER — METFORMIN HYDROCHLORIDE 500 MG/1
500 TABLET, EXTENDED RELEASE ORAL
Qty: 90 TABLET | Refills: 0 | Status: SHIPPED | OUTPATIENT
Start: 2024-03-06

## 2024-03-06 RX ORDER — FINASTERIDE 5 MG/1
5 TABLET, FILM COATED ORAL DAILY
Qty: 90 TABLET | Refills: 0 | Status: SHIPPED | OUTPATIENT
Start: 2024-03-06

## 2024-03-06 NOTE — TELEPHONE ENCOUNTER
Requested Prescriptions     Pending Prescriptions Disp Refills    METFORMIN  MG Oral Tablet 24 Hr [Pharmacy Med Name: METFORMIN ER 500MG 24HR TABS] 90 tablet 3     Sig: TAKE 1 TABLET(500 MG) BY MOUTH DAILY WITH BREAKFAST    FINASTERIDE 5 MG Oral Tab [Pharmacy Med Name: FINASTERIDE 5MG TABLETS] 90 tablet 3     Sig: TAKE 1 TABLET(5 MG) BY MOUTH DAILY     LOV 9/8/2023   Last labs 10/26/23  Pt will get labs done before upcoming appt    Patient was asked to follow-up in: 6 months    Appointment scheduled: 3/15/2024 Jason Mantilla MD     Medication refilled per protocol.

## 2024-03-15 ENCOUNTER — OFFICE VISIT (OUTPATIENT)
Dept: FAMILY MEDICINE CLINIC | Facility: CLINIC | Age: 84
End: 2024-03-15
Payer: MEDICAID

## 2024-03-15 VITALS
WEIGHT: 185 LBS | HEIGHT: 67 IN | RESPIRATION RATE: 12 BRPM | SYSTOLIC BLOOD PRESSURE: 124 MMHG | BODY MASS INDEX: 29.03 KG/M2 | DIASTOLIC BLOOD PRESSURE: 66 MMHG | HEART RATE: 74 BPM

## 2024-03-15 DIAGNOSIS — E03.8 SUBCLINICAL HYPOTHYROIDISM: ICD-10-CM

## 2024-03-15 DIAGNOSIS — I35.1 NONRHEUMATIC AORTIC VALVE INSUFFICIENCY: ICD-10-CM

## 2024-03-15 DIAGNOSIS — Z00.00 ANNUAL PHYSICAL EXAM: Primary | ICD-10-CM

## 2024-03-15 DIAGNOSIS — E78.2 MIXED HYPERLIPIDEMIA: ICD-10-CM

## 2024-03-15 DIAGNOSIS — R30.0 DYSURIA: ICD-10-CM

## 2024-03-15 DIAGNOSIS — I10 ESSENTIAL HYPERTENSION: ICD-10-CM

## 2024-03-15 DIAGNOSIS — I25.10 ATHEROSCLEROSIS OF NATIVE CORONARY ARTERY OF NATIVE HEART WITHOUT ANGINA PECTORIS: ICD-10-CM

## 2024-03-15 DIAGNOSIS — I77.810 DILATATION OF THORACIC AORTA (HCC): ICD-10-CM

## 2024-03-15 DIAGNOSIS — N18.31 CHRONIC KIDNEY DISEASE, STAGE 3A (HCC): ICD-10-CM

## 2024-03-15 DIAGNOSIS — H25.013 CORTICAL AGE-RELATED CATARACT OF BOTH EYES: ICD-10-CM

## 2024-03-15 DIAGNOSIS — Z95.1 HX OF CABG: ICD-10-CM

## 2024-03-15 DIAGNOSIS — E11.65 TYPE 2 DIABETES MELLITUS WITH HYPERGLYCEMIA, WITHOUT LONG-TERM CURRENT USE OF INSULIN (HCC): ICD-10-CM

## 2024-03-15 LAB
BILIRUB UR QL STRIP.AUTO: NEGATIVE
CARTRIDGE LOT#: 629 NUMERIC
CLARITY UR REFRACT.AUTO: CLEAR
GLUCOSE UR STRIP.AUTO-MCNC: 50 MG/DL
HEMOGLOBIN A1C: 7.5 % (ref 4.3–5.6)
KETONES UR STRIP.AUTO-MCNC: NEGATIVE MG/DL
LEUKOCYTE ESTERASE UR QL STRIP.AUTO: NEGATIVE
NITRITE UR QL STRIP.AUTO: NEGATIVE
PH UR STRIP.AUTO: 6 [PH] (ref 5–8)
PROT UR STRIP.AUTO-MCNC: NEGATIVE MG/DL
RBC UR QL AUTO: NEGATIVE
SP GR UR STRIP.AUTO: 1.02 (ref 1–1.03)
UROBILINOGEN UR STRIP.AUTO-MCNC: NORMAL MG/DL

## 2024-03-15 PROCEDURE — 87086 URINE CULTURE/COLONY COUNT: CPT | Performed by: FAMILY MEDICINE

## 2024-03-15 PROCEDURE — 81003 URINALYSIS AUTO W/O SCOPE: CPT | Performed by: FAMILY MEDICINE

## 2024-03-15 NOTE — ASSESSMENT & PLAN NOTE
A1c is 7.2 done 10/26/2023 which shows hyperglycemia and borderline control, maintain vigilance and increase activity and medications as listed.  Diabetic medications include glipiZIDE ER 10 MG Oral Tablet 24 Hr [[id number]], metFORMIN  MG Oral Tablet 24 Hr [[id number]].

## 2024-03-15 NOTE — PROGRESS NOTES
Subjective:   Andrae Calderón is a 83 year old male who presents for a Medicare Subsequent Annual Wellness visit (Pt already had Initial Annual Wellness) and scheduled follow up of multiple significant but stable problems.   Urinating 1-2 x nightly, burnign when urinating.     History/Other:   Fall Risk Assessment:   He has been screened for Falls and is low risk.    Wt Readings from Last 5 Encounters:   03/15/24 185 lb (83.9 kg)   09/08/23 185 lb 9.6 oz (84.2 kg)   03/03/23 186 lb 9.6 oz (84.6 kg)   09/02/22 193 lb 12.8 oz (87.9 kg)   05/06/22 200 lb 12.8 oz (91.1 kg)        Depression Screening (PHQ-2/PHQ-9): PHQ-2 SCORE: 0  , done 3/15/2024          Advanced Directives:   He does NOT have a Living Will. [ ]  He does NOT have a Power of  for Health Care. [ ]  Discussed Advance Care Planning with patient (and family/surrogate if present). Standard forms made available to patient in After Visit Summary.      Patient Active Problem List   Diagnosis    Essential hypertension    Mixed hyperlipidemia    Atherosclerosis of native coronary artery of native heart without angina pectoris    Type 2 diabetes mellitus with hyperglycemia (HCC)    Dilatation of thoracic aorta (HCC)    Aortic insufficiency    Hx of CABG    Cataracts, bilateral    Chronic kidney disease, stage 3a (HCC)    Subclinical hypothyroidism     Allergies:  He has No Known Allergies.    Current Medications:  Outpatient Medications Marked as Taking for the 3/15/24 encounter (Office Visit) with Jason Mantilla MD   Medication Sig    metFORMIN  MG Oral Tablet 24 Hr TAKE 1 TABLET(500 MG) BY MOUTH DAILY WITH BREAKFAST    finasteride 5 MG Oral Tab TAKE 1 TABLET(5 MG) BY MOUTH DAILY    losartan 25 MG Oral Tab Take 1 tablet (25 mg total) by mouth daily.    glipiZIDE ER 10 MG Oral Tablet 24 Hr Take 1 tablet (10 mg total) by mouth daily.    tamsulosin 0.4 MG Oral Cap Take 1 capsule (0.4 mg total) by mouth 2 (two) times daily.    atorvastatin 10 MG Oral Tab  Take 1 tablet (10 mg total) by mouth daily.    Glucose Blood (ONETOUCH VERIO) In Vitro Strip 1 strip by In Vitro route in the morning and 1 strip before bedtime.    Ketorolac Tromethamine 0.4 % Ophthalmic Solution Apply 1 drop to eye 4 (four) times daily.    ofloxacin 0.3 % Ophthalmic Solution     prednisoLONE 1 % Ophthalmic Suspension Apply 1 drop to eye 4 (four) times daily.    Lancet Devices (ONETOUCH DELICA LANCING DEV) Does not apply Misc Tests tid.E11.65    aspirin ([facility] ASPIRIN EC) 325 MG Oral Tab EC Take 1 tablet (325 mg total) by mouth daily.       Medical History:  He  has a past medical history of Essential hypertension and Hyperlipidemia.  Surgical History:  He  has a past surgical history that includes cabg ().   Family History:  His family history includes Stroke (age of onset: 69) in his father; Stroke (age of onset: 73) in his mother.  Social History:  He  reports that he quit smoking about 50 years ago. His smoking use included cigarettes. He has never used smokeless tobacco. He reports that he does not drink alcohol and does not use drugs.    Tobacco:  He smoked tobacco in the past but quit greater than 12 months ago.  Social History    Tobacco Use      Smoking status: Former        Types: Cigarettes        Quit date: 3/6/1974        Years since quittin.0      Smokeless tobacco: Never       CAGE Alcohol Screen:   Cage screening not needed because reported NO to Alcohol use on social history.      Patient Care Team:  Jason Mantilla MD as PCP - General (Family Practice)  Patrice Bello MD (CARDIOLOGY)  Francois Caballero Md (OPHTHALMOLOGY)    Review of Systems   Constitutional: Negative.  Negative for activity change, appetite change, chills and fever.   HENT: Negative.     Eyes: Negative.    Respiratory: Negative.  Negative for shortness of breath.    Cardiovascular: Negative.  Negative for chest pain and palpitations.   Gastrointestinal: Negative.  Negative for abdominal pain.   Genitourinary:  Negative.  Negative for dysuria.   Musculoskeletal:  Negative for arthralgias.   Skin: Negative.  Negative for rash.   Allergic/Immunologic: Negative.    Neurological: Negative.        Objective:   Physical Exam  Vitals and nursing note reviewed.   Constitutional:       General: He is not in acute distress.     Appearance: Normal appearance.   HENT:      Head: Normocephalic and atraumatic.      Right Ear: Tympanic membrane and external ear normal.      Left Ear: Tympanic membrane and external ear normal.      Nose: Nose normal.      Mouth/Throat:      Mouth: Mucous membranes are moist.   Eyes:      Extraocular Movements: Extraocular movements intact.      Pupils: Pupils are equal, round, and reactive to light.   Cardiovascular:      Rate and Rhythm: Normal rate and regular rhythm.      Pulses: Normal pulses.           Carotid pulses are 2+ on the right side and 2+ on the left side.       Radial pulses are 2+ on the right side and 2+ on the left side.        Dorsalis pedis pulses are 2+ on the right side and 2+ on the left side.        Posterior tibial pulses are 2+ on the right side and 2+ on the left side.      Heart sounds: Normal heart sounds, S1 normal and S2 normal. No murmur heard.  Pulmonary:      Effort: Pulmonary effort is normal. No respiratory distress.      Breath sounds: Normal breath sounds. No wheezing.   Abdominal:      General: Abdomen is flat. Bowel sounds are normal. There is no distension.      Palpations: Abdomen is soft.      Tenderness: There is no abdominal tenderness.   Musculoskeletal:         General: Normal range of motion.      Cervical back: Normal range of motion and neck supple.      Right lower leg: No edema.      Left lower leg: No edema.      Right foot: No deformity.      Left foot: No deformity.   Feet:      Right foot:      Protective Sensation: 6 sites tested.  6 sites sensed.      Skin integrity: Skin integrity normal. No ulcer.      Left foot:      Protective Sensation: 6  sites tested.  6 sites sensed.      Skin integrity: Skin integrity normal. No ulcer.   Skin:     General: Skin is warm and dry.      Capillary Refill: Capillary refill takes less than 2 seconds.      Findings: No rash.   Neurological:      General: No focal deficit present.      Mental Status: He is alert and oriented to person, place, and time.   Psychiatric:         Mood and Affect: Mood normal.         Behavior: Behavior normal.         Thought Content: Thought content normal.         Judgment: Judgment normal.       /66   Pulse 74   Resp 12   Ht 5' 7\" (1.702 m)   Wt 185 lb (83.9 kg)   BMI 28.98 kg/m²  Estimated body mass index is 28.98 kg/m² as calculated from the following:    Height as of this encounter: 5' 7\" (1.702 m).    Weight as of this encounter: 185 lb (83.9 kg).    Last A1c value was 7.5% done 3/15/2024.     Assessment & Plan:   Andrae Caldeórn is a 83 year old male who presents for a Medicare Assessment.     1. Annual physical exam (Primary)  2. Type 2 diabetes mellitus with hyperglycemia, without long-term current use of insulin (HCC)  Overview:  Fasting blood sugar in the 1/21/1940 range with an A1c of 8.1% in March 2013 before CABG, A1c 6.9% 2/2017, no meds  Metformin  daily and glipizide ER 10 increased 3/1/2021,  started 8.2019, and 2.2020  CKD 3a   Assessment & Plan:  A1c is 7.2 done 10/26/2023 which shows hyperglycemia and borderline control, maintain vigilance and increase activity and medications as listed.  Diabetic medications include glipiZIDE ER 10 MG Oral Tablet 24 Hr [[id number]], metFORMIN  MG Oral Tablet 24 Hr [[id number]].   Orders:  -     POC Hgb A1C  -     Comp Metabolic Panel (14)  -     Lipid Panel  -     Hemoglobin A1C  -     Microalb/Creat Ratio, Random Urine  3. Dilatation of thoracic aorta (HCC)  Overview:  4.2 Cm at [facility] 2/2019, Dr keenan (formerly [facility]) managing, on statin and ARB  Assessment & Plan:  Stable, continue present management   4.  Chronic kidney disease, stage 3a (HCC)  Overview:  GFR 57  Assessment & Plan:  10/26/2023: EGFR 46 (L); CREATININE 1.51 (H) stable, continue present management   Orders:  -     CBC With Differential With Platelet  5. Essential hypertension  Overview:  Losartan 25  Assessment & Plan:  BP shows good control with last BP of 134/78. Continue lifestyle changes, diet, exercise and weight loss.   Last K was 4.8 done on 10/26/2023.  Last Cr was 1.51 done on 10/26/2023.  Last eGFR was 46 on 10/26/2023.  BP Meds: losartan Tabs - 25 MG    6. Mixed hyperlipidemia  Overview:  Atorvastatin 10  Assessment & Plan:  Cholesterol shows Good control. Long term heart-healthy diet and lifestyle discussed and encouraged to reduce risk of cardiovascular disease.  Cholesterol: 127, done on 10/26/2023.  HDL Cholesterol: 39, done on 10/26/2023.  TriGlycerides 134, done on 10/26/2023.  LDL Cholesterol: 66, done on 10/26/2023.   Cholesterol medications include atorvastatin 10 MG Oral Tab [[id number]].     7. Atherosclerosis of native coronary artery of native heart without angina pectoris  Overview:  [facility] managing, hx CABG.  Assessment & Plan:  Stable, continue present management   8. Nonrheumatic aortic valve insufficiency  Overview:  Dr [facility] at [facility] managing  Assessment & Plan:  Stable, continue present management   9. Hx of CABG  Overview:  3/2013 [facility] to the LAD, SVG to the Dx and OM  Assessment & Plan:  Stable, continue present management   10. Subclinical hypothyroidism  Assessment & Plan:  Thyroid shows Good control.   TSH: 2.58, done on 10/26/2023.       Orders:  -     TSH W Reflex To Free T4  11. Cortical age-related cataract of both eyes  Overview:  Per Francois Caballero on 11/24/2021  Assessment & Plan:  Stable, continue present management   12. Dysuria  -     Urine Culture, Routine; Future; Expected date: 03/15/2024  -     Urinalysis, Routine; Future; Expected date: 03/15/2024  -     Urine Culture, Routine  -     Urinalysis,  Routine    The patient indicates understanding of these issues and agrees to the plan.  Reinforced healthy diet, lifestyle, and exercise.  Encouraged diabetic eye exam, borderline diabetic control but at his age it is appropriate.  Work on lifestyle changes and reassess in 6 months for diabetes    Return in about 6 months (around 9/15/2024) for recheck.     Jason Mantilla MD, 3/15/2024     Supplementary Documentation:   General Health:           Andrae Calderón's SCREENING SCHEDULE   Tests on this list are recommended by your physician but may not be covered, or covered at this frequency, by your insurer.   Please check with your insurance carrier before scheduling to verify coverage.   PREVENTATIVE SERVICES FREQUENCY &  COVERAGE DETAILS LAST COMPLETION DATE   Diabetes Screening    Fasting Blood Sugar / Glucose    One screening every 12 months if never tested or if previously tested but not diagnosed with pre-diabetes   One screening every 6 months if diagnosed with pre-diabetes Lab Results   Component Value Date     (H) 10/26/2023        Cardiovascular Disease Screening    Lipid Panel  Cholesterol  Lipoprotein (HDL)  Triglycerides Covered every 5 years for all Medicare beneficiaries without apparent signs or symptoms of cardiovascular disease Lab Results   Component Value Date    CHOLEST 127 10/26/2023    HDL 39 (L) 10/26/2023    LDL 66 10/26/2023    TRIG 134 10/26/2023         Electrocardiogram (EKG)   Covered if needed at Welcome to Medicare, and non-screening if indicated for medical reasons 05/23/2022      Ultrasound Screening for Abdominal Aortic Aneurysm (AAA) Covered once in a lifetime for one of the following risk factors    Men who are 65-75 years old and have ever smoked    Anyone with a family history -     Colorectal Cancer Screening  Covered for ages 50-85; only need ONE of the following:    Colonoscopy   Covered every 10 years    Covered every 2 years if patient is at high risk or previous  colonoscopy was abnormal -    No recommendations at this time    Flexible Sigmoidoscopy   Covered every 4 years -    Fecal Occult Blood Test Covered annually -   Prostate Cancer Screening    Prostate-Specific Antigen (PSA) Annually No results found for: \"PSA\"  There are no preventive care reminders to display for this patient.   Immunizations    Influenza Covered once per flu season  Please get every year -  No recommendations at this time    Pneumococcal Each vaccine ([id number] & [id number]) covered once after 65 Prevnar 13: -    [id number]: 02/14/2018     No recommendations at this time    Hepatitis B One screening covered for patients with certain risk factors   -  No recommendations at this time    Tetanus Toxoid Not covered by Medicare Part B unless medically necessary (cut with metal); may be covered with your pharmacy prescription benefits -    Tetanus, Diptheria and Pertusis TD and TDaP Not covered by Medicare Part B -  No recommendations at this time    Zoster Not covered by Medicare Part B; may be covered with your pharmacy  prescription benefits -  Zoster Vaccines(2 of 2) due on 11/01/2023     Diabetes      Hemoglobin A1C Annually; if last result is elevated, may be asked to retest more frequently.    Medicare covers every 3 months Lab Results   Component Value Date     (H) 11/22/2021    A1C 7.5 (A) 03/15/2024       No recommendations at this time    Creat/alb ratio Annually Lab Results   Component Value Date    MICROALBCREA 15.9 09/08/2020       LDL Annually Lab Results   Component Value Date    LDL 66 10/26/2023       Dilated Eye Exam Annually Last Diabetic Eye Exam:  No data recorded  No data recorded       Annual Monitoring of Persistent Medications (ACE/ARB, digoxin diuretics, anticonvulsants)    Potassium Annually Lab Results   Component Value Date    K 4.8 10/26/2023         Creatinine   Annually Lab Results   Component Value Date    CREATSERUM 1.51 (H) 10/26/2023         BUN Annually Lab  Results   Component Value Date    BUN 26 (H) 10/26/2023       Drug Serum Conc Annually No results found for: \"DIGOXIN\", \"DIG\", \"VALP\"

## 2024-03-15 NOTE — ASSESSMENT & PLAN NOTE
Cholesterol shows Good control. Long term heart-healthy diet and lifestyle discussed and encouraged to reduce risk of cardiovascular disease.  Cholesterol: 127, done on 10/26/2023.  HDL Cholesterol: 39, done on 10/26/2023.  TriGlycerides 134, done on 10/26/2023.  LDL Cholesterol: 66, done on 10/26/2023.   Cholesterol medications include atorvastatin 10 MG Oral Tab [[id number]].

## 2024-03-15 NOTE — ASSESSMENT & PLAN NOTE
BP shows good control with last BP of 134/78. Continue lifestyle changes, diet, exercise and weight loss.   Last K was 4.8 done on 10/26/2023.  Last Cr was 1.51 done on 10/26/2023.  Last eGFR was 46 on 10/26/2023.  BP Meds: losartan Tabs - 25 MG

## 2024-05-11 DIAGNOSIS — E78.2 MIXED HYPERLIPIDEMIA: ICD-10-CM

## 2024-05-13 RX ORDER — ATORVASTATIN CALCIUM 10 MG/1
10 TABLET, FILM COATED ORAL DAILY
Qty: 90 TABLET | Refills: 3 | Status: SHIPPED | OUTPATIENT
Start: 2024-05-13

## 2024-05-13 NOTE — TELEPHONE ENCOUNTER
Requested Prescriptions     Pending Prescriptions Disp Refills    ATORVASTATIN 10 MG Oral Tab [Pharmacy Med Name: ATORVASTATIN 10MG TABLETS] 90 tablet 3     Sig: TAKE 1 TABLET(10 MG) BY MOUTH DAILY     LOV 3/15/2024     Patient was asked to follow-up in: 6 months    Appointment scheduled: 3/21/2025 Jason Mantilla MD     Medication refilled per protocol.

## 2024-06-06 DIAGNOSIS — N40.1 BENIGN PROSTATIC HYPERPLASIA WITH INCOMPLETE BLADDER EMPTYING: ICD-10-CM

## 2024-06-06 DIAGNOSIS — R39.14 BENIGN PROSTATIC HYPERPLASIA WITH INCOMPLETE BLADDER EMPTYING: ICD-10-CM

## 2024-06-06 RX ORDER — BLOOD SUGAR DIAGNOSTIC
1 STRIP MISCELLANEOUS 2 TIMES DAILY
Qty: 200 STRIP | Refills: 3 | Status: SHIPPED | OUTPATIENT
Start: 2024-06-06

## 2024-06-06 RX ORDER — FINASTERIDE 5 MG/1
5 TABLET, FILM COATED ORAL DAILY
Qty: 90 TABLET | Refills: 0 | Status: SHIPPED | OUTPATIENT
Start: 2024-06-06

## 2024-06-06 NOTE — TELEPHONE ENCOUNTER
Requested Prescriptions     Pending Prescriptions Disp Refills    ONETOUCH VERIO In Vitro Strip [Pharmacy Med Name: ONE TOUCH VERIO TEST ST(NEW)100S] 200 strip 3     Sig: USE AS DIRECTED TO TEST BLOOD GLUCOSE TWICE DAILY    FINASTERIDE 5 MG Oral Tab [Pharmacy Med Name: FINASTERIDE 5MG TABLETS] 90 tablet 0     Sig: TAKE 1 TABLET(5 MG) BY MOUTH DAILY     LOV 3/15/2024     Patient was asked to follow-up in: 6 months    Appointment scheduled: 3/21/2025 Jason Mantilla MD     Medication refilled per protocol.        Routed to Jose Rafael Gomez MD, for review.

## 2024-06-08 DIAGNOSIS — E11.65 TYPE 2 DIABETES MELLITUS WITH HYPERGLYCEMIA, WITHOUT LONG-TERM CURRENT USE OF INSULIN (HCC): ICD-10-CM

## 2024-06-10 RX ORDER — METFORMIN HYDROCHLORIDE 500 MG/1
500 TABLET, EXTENDED RELEASE ORAL
Qty: 90 TABLET | Refills: 3 | Status: SHIPPED | OUTPATIENT
Start: 2024-06-10

## 2024-06-10 NOTE — TELEPHONE ENCOUNTER
Requested Prescriptions     Pending Prescriptions Disp Refills    METFORMIN  MG Oral Tablet 24 Hr [Pharmacy Med Name: METFORMIN ER 500MG 24HR TABS] 90 tablet 0     Sig: TAKE 1 TABLET(500 MG) BY MOUTH DAILY WITH BREAKFAST     LOV 3/15/2024     Patient was asked to follow-up in: 6 months    Appointment scheduled: 3/21/2025 Jason Mantilla MD     Medication failed  protocol.    Routed to Dr. Jose Rafael MD

## 2024-08-29 NOTE — ASSESSMENT & PLAN NOTE
A1c is 7.5 done 3/15/2024 which shows hyperglycemia and borderline control, maintain vigilance and increase activity and medications as listed.  Diabetic medications include METFORMIN  MG Oral Tablet 24 Hr [[id number]], glipiZIDE ER 10 MG Oral Tablet 24 Hr [[id number]].

## 2024-08-30 ENCOUNTER — HOSPITAL ENCOUNTER (OUTPATIENT)
Dept: GENERAL RADIOLOGY | Facility: HOSPITAL | Age: 84
Discharge: HOME | End: 2024-08-30
Attending: FAMILY MEDICINE
Payer: MEDICAID

## 2024-08-30 ENCOUNTER — OFFICE VISIT (OUTPATIENT)
Dept: FAMILY MEDICINE CLINIC | Facility: CLINIC | Age: 84
End: 2024-08-30
Payer: MEDICAID

## 2024-08-30 VITALS
BODY MASS INDEX: 28.88 KG/M2 | HEIGHT: 67 IN | DIASTOLIC BLOOD PRESSURE: 60 MMHG | SYSTOLIC BLOOD PRESSURE: 110 MMHG | WEIGHT: 184 LBS | HEART RATE: 60 BPM | RESPIRATION RATE: 20 BRPM

## 2024-08-30 DIAGNOSIS — N18.31 STAGE 3A CHRONIC KIDNEY DISEASE (CKD) (HCC): ICD-10-CM

## 2024-08-30 DIAGNOSIS — E78.5 DYSLIPIDEMIA: ICD-10-CM

## 2024-08-30 DIAGNOSIS — R39.15 BENIGN PROSTATIC HYPERPLASIA (BPH) WITH URINARY URGENCY: ICD-10-CM

## 2024-08-30 DIAGNOSIS — G89.29 CHRONIC MIDLINE LOW BACK PAIN WITHOUT SCIATICA: ICD-10-CM

## 2024-08-30 DIAGNOSIS — G89.29 CHRONIC MIDLINE LOW BACK PAIN WITHOUT SCIATICA: Primary | ICD-10-CM

## 2024-08-30 DIAGNOSIS — N40.1 BENIGN PROSTATIC HYPERPLASIA (BPH) WITH URINARY URGENCY: ICD-10-CM

## 2024-08-30 DIAGNOSIS — R35.0 URINARY FREQUENCY: ICD-10-CM

## 2024-08-30 DIAGNOSIS — E11.65 TYPE 2 DIABETES MELLITUS WITH HYPERGLYCEMIA, WITHOUT LONG-TERM CURRENT USE OF INSULIN (HCC): ICD-10-CM

## 2024-08-30 DIAGNOSIS — E07.9 THYROID DISORDER: ICD-10-CM

## 2024-08-30 DIAGNOSIS — I10 ESSENTIAL HYPERTENSION: ICD-10-CM

## 2024-08-30 DIAGNOSIS — M54.50 CHRONIC MIDLINE LOW BACK PAIN WITHOUT SCIATICA: Primary | ICD-10-CM

## 2024-08-30 DIAGNOSIS — M54.50 CHRONIC MIDLINE LOW BACK PAIN WITHOUT SCIATICA: ICD-10-CM

## 2024-08-30 PROCEDURE — 72110 X-RAY EXAM L-2 SPINE 4/>VWS: CPT | Performed by: FAMILY MEDICINE

## 2024-08-30 PROCEDURE — 99214 OFFICE O/P EST MOD 30 MIN: CPT | Performed by: FAMILY MEDICINE

## 2024-08-30 RX ORDER — OXYBUTYNIN CHLORIDE 10 MG/1
10 TABLET, EXTENDED RELEASE ORAL DAILY
Qty: 90 TABLET | Refills: 3 | Status: SHIPPED | OUTPATIENT
Start: 2024-08-30 | End: 2025-08-25

## 2024-08-30 RX ORDER — AMOXICILLIN 500 MG/1
1000 CAPSULE ORAL 2 TIMES DAILY
Qty: 40 CAPSULE | Refills: 0 | Status: SHIPPED | OUTPATIENT
Start: 2024-08-30 | End: 2024-09-09

## 2024-08-30 NOTE — PROGRESS NOTES
Andrae is a 83 year old male coming in for had concerns including Back Pain (Low back pain - ongoing issue/) and Urinary Frequency (Ongoing issue- meds not helping).    Subjective:   HPI   More frequncy and cannot hold some BPH symptoms of incomplete empyting and posit void dribling.  Already on flomax and proscar.   Hx L spine DDD with past films, slowly worsening gait, weaknessa and constant pain worse with walking and standing.  Motrin 600 with some relief.   Had films in > 10 years ago.     Objective:   /60 (BP Location: Left arm)   Pulse 60   Resp 20   Ht 5' 7\" (1.702 m)   Wt 184 lb (83.5 kg)   BMI 28.82 kg/m²  Body mass index is 28.82 kg/m².   Physical Exam    Thin  gentleman no respiratory distress, 5 5 strength in the hips knees ankles, ambulating with a wide-based gait.    Assessment & Plan:   1. Chronic midline low back pain without sciatica (Primary)  -     XR LUMBAR SPINE (MIN 4 VIEWS) (CPT=72110); Future; Expected date: 08/30/2024  -     OP REFERRAL TO [facility]  2. Urinary frequency  3. Type 2 diabetes mellitus with hyperglycemia, without long-term current use of insulin (HCC)  Overview:  Fasting blood sugar in the 1/21/1940 range with an A1c of 8.1% in March 2013 before CABG, A1c 6.9% 2/2017, no meds  Metformin  daily and glipizide ER 10 increased 3/1/2021,  started 8.2019, and 2.2020  CKD 3a   Assessment & Plan:  A1c is 7.5 done 3/15/2024 which shows hyperglycemia and borderline control, maintain vigilance and increase activity and medications as listed.  Diabetic medications include METFORMIN  MG Oral Tablet 24 Hr [[id number]], glipiZIDE ER 10 MG Oral Tablet 24 Hr [[id number]].   Orders:  -     CBC With Differential With Platelet  -     Comp Metabolic Panel (14)  -     Hemoglobin A1C  4. Benign prostatic hyperplasia (BPH) with urinary urgency  -     oxyBUTYnin Chloride ER; Take 1 tablet (10 mg total) by mouth daily.  Dispense: 90 tablet; Refill:  3  5. Dyslipidemia  -     TSH W Reflex To Free T4  6. Stage 3a chronic kidney disease (CKD) (HCC)  -     CBC With Differential With Platelet  -     Comp Metabolic Panel (14)  7. Thyroid disorder  -     TSH W Reflex To Free T4  8. Essential hypertension  Overview:  Losartan 25  Orders:  -     TSH W Reflex To Free T4  -     CBC With Differential With Platelet  -     Comp Metabolic Panel (14)  Other orders  -     Amoxicillin; Take 2 capsules (1,000 mg total) by mouth 2 (two) times daily for 10 days.  Dispense: 40 capsule; Refill: 0   Will try ditropan as insurance will not cover other meds with less cholinergic effects  Discussed the back.  No recent films so we will get an x-ray and order physical therapy, okay for the ibuprofen but we will try to avoid other medications at this time.  See how he does on the Ditropan for the bladder and reassess in about 1 to 2 months.  Lab work for diabetes is set up as well    I have discontinued Andrae Calderón's Ketorolac Tromethamine, ofloxacin, and prednisoLONE. I am also having him start on oxybutynin ER and amoxicillin. Additionally, I am having him maintain his aspirin, OneTouch Verio IQ System, OneTouch Delica Lancing Dev, glipiZIDE ER, tamsulosin, losartan, atorvastatin, OneTouch Verio, finasteride, and metFORMIN ER.       Return in about 2 months (around 10/30/2024) for recheck.

## 2024-08-31 DIAGNOSIS — N40.1 BENIGN PROSTATIC HYPERPLASIA WITH INCOMPLETE BLADDER EMPTYING: ICD-10-CM

## 2024-08-31 DIAGNOSIS — R39.14 BENIGN PROSTATIC HYPERPLASIA WITH INCOMPLETE BLADDER EMPTYING: ICD-10-CM

## 2024-09-03 RX ORDER — FINASTERIDE 5 MG/1
5 TABLET, FILM COATED ORAL DAILY
Qty: 90 TABLET | Refills: 4 | Status: SHIPPED | OUTPATIENT
Start: 2024-09-03

## 2024-09-03 NOTE — TELEPHONE ENCOUNTER
Refill request for:    Requested Prescriptions     Pending Prescriptions Disp Refills    FINASTERIDE 5 MG Oral Tab [Pharmacy Med Name: FINASTERIDE 5MG TABLETS] 90 tablet 0     Sig: TAKE 1 TABLET(5 MG) BY MOUTH DAILY        Last Prescribed Quantity Refills   6/6/24 90 0     LOV 8/30/2024     Patient was asked to follow-up in: 2 months    Appointment scheduled: 3/21/2025 Jason Mantilla MD    Medication not on protocol.     # 90 with 0 refills routed to Jason Mantilla MD for review

## 2024-09-04 DIAGNOSIS — R39.14 BENIGN PROSTATIC HYPERPLASIA WITH INCOMPLETE BLADDER EMPTYING: ICD-10-CM

## 2024-09-04 DIAGNOSIS — E11.65 TYPE 2 DIABETES MELLITUS WITH HYPERGLYCEMIA, WITHOUT LONG-TERM CURRENT USE OF INSULIN (HCC): ICD-10-CM

## 2024-09-04 DIAGNOSIS — N40.1 BENIGN PROSTATIC HYPERPLASIA WITH INCOMPLETE BLADDER EMPTYING: ICD-10-CM

## 2024-09-12 RX ORDER — GLIPIZIDE 10 MG/1
10 TABLET, FILM COATED, EXTENDED RELEASE ORAL DAILY
Qty: 90 TABLET | Refills: 1 | Status: SHIPPED | OUTPATIENT
Start: 2024-09-12

## 2024-09-12 RX ORDER — TAMSULOSIN HYDROCHLORIDE 0.4 MG/1
0.4 CAPSULE ORAL 2 TIMES DAILY
Qty: 180 CAPSULE | Refills: 4 | Status: SHIPPED | OUTPATIENT
Start: 2024-09-12

## 2024-09-12 NOTE — TELEPHONE ENCOUNTER
GLIPIZIDE ER 10MG TABLETS          Will file in chart as:     Sig: TAKE 1 TABLET(10 MG) BY MOUTH DAILY    Disp: 90 tablet    Refills: 4 (Pharmacy requested: Not specified)    Start: 9/4/2024    Class: Normal    Non-formulary For: Type 2 diabetes mellitus with hyperglycemia, without long-term current use of insulin (HCC)    To pharmacy: ZERO refills remain on this prescription. Your patient is requesting advance approval of refills for this medication to PREVENT ANY MISSED DOSES    Last ordered: 1 year ago (8/24/2023) by Jason Mantilla MD    Last refill: 8/31/2024    Rx #: [id number]    Diabetes Medication Protocol [id number]/04/2024 08:02 AM   Protocol Details EGFRCR or GFRNAA > 50    Last A1C < 7.5 and within past 6 months    In person appointment or virtual visit in the past 6 mos or appointment in next 3 mos    Microalbumin procedure in past 12 months or taking ACE/ARB    GFR in the past 12 months       Name from pharmacy: TAMSULOSIN 0.4MG CAPSULES         Will file in chart as:     Sig: TAKE 1 CAPSULE(0.4 MG) BY MOUTH TWICE DAILY    Disp: 180 capsule    Refills: 4 (Pharmacy requested: Not specified)    Start: 9/4/2024    Class: Normal    Non-formulary For: Benign prostatic hyperplasia with incomplete bladder emptying    To pharmacy: ZERO refills remain on this prescription. Your patient is requesting advance approval of refills for this medication to PREVENT ANY MISSED DOSES    Last ordered: 1 year ago (8/24/2023) by Jason Mantilla MD    Last refill: 8/31/2024    Rx #: [id number]    Genitourinary Medications Passed

## 2024-09-12 NOTE — TELEPHONE ENCOUNTER
LOV 08/30/20/24 franklin/ cristopher  Last Lipid 10/26/23    Physical scheduled 03/21/2025    Dr Mantilla noted return visit requested Return in about 2 months (around 10/30/2024) for recheck.

## 2024-09-26 DIAGNOSIS — E11.65 TYPE 2 DIABETES MELLITUS WITH HYPERGLYCEMIA, WITHOUT LONG-TERM CURRENT USE OF INSULIN (HCC): ICD-10-CM

## 2024-10-02 ENCOUNTER — TELEPHONE (OUTPATIENT)
Dept: FAMILY MEDICINE CLINIC | Facility: CLINIC | Age: 84
End: 2024-10-02

## 2024-10-02 DIAGNOSIS — E11.65 TYPE 2 DIABETES MELLITUS WITH HYPERGLYCEMIA, WITHOUT LONG-TERM CURRENT USE OF INSULIN (HCC): ICD-10-CM

## 2024-10-02 RX ORDER — GLIPIZIDE 10 MG/1
10 TABLET, FILM COATED, EXTENDED RELEASE ORAL DAILY
Qty: 90 TABLET | Refills: 1 | OUTPATIENT
Start: 2024-10-02

## 2024-10-02 RX ORDER — GLIPIZIDE 10 MG/1
10 TABLET, FILM COATED, EXTENDED RELEASE ORAL DAILY
Qty: 90 TABLET | Refills: 3 | Status: SHIPPED | OUTPATIENT
Start: 2024-10-02

## 2024-10-02 NOTE — TELEPHONE ENCOUNTER
Patient requesting medication refill on   glipiZIDE ER 10 MG Oral Tablet 24 Hr     Patient stated that she call the pharmacy theres said no more refill  On this medication        Sent to   [id number] [place], [place] - [address] AT [facility] [address]

## 2024-10-02 NOTE — TELEPHONE ENCOUNTER
GLIPIZIDE ER 10MG TABLETS          Will file in chart as:     The original prescription was reordered on 10/2/2024 by Dustin Santana RN. Renewing this prescription may not be appropriate.     Possible duplicate: Heathver to review recent actions on this medication    Sig: TAKE 1 TABLET(10 MG) BY MOUTH DAILY    Disp: 90 tablet    Refills: 1 (Pharmacy requested: Not specified)    Start: 9/26/2024    Class: Normal    Non-formulary For: Type 2 diabetes mellitus with hyperglycemia, without long-term current use of insulin (HCC)

## 2024-10-17 ENCOUNTER — HOSPITAL ENCOUNTER (OUTPATIENT)
Dept: LAB | Facility: HOSPITAL | Age: 84
Discharge: HOME | End: 2024-10-17
Attending: FAMILY MEDICINE
Payer: MEDICAID

## 2024-10-17 LAB
ALBUMIN SERPL-MCNC: 4.7 G/DL (ref 3.2–4.8)
ALBUMIN/GLOB SERPL: 1.5 {RATIO} (ref 1–2)
ALP LIVER SERPL-CCNC: 58 U/L
ALT SERPL-CCNC: 17 U/L
ANION GAP SERPL CALC-SCNC: 7 MMOL/L (ref 0–18)
AST SERPL-CCNC: 21 U/L (ref ?–34)
BASOPHILS # BLD AUTO: 0.02 X10(3) UL (ref 0–0.2)
BASOPHILS NFR BLD AUTO: 0.3 %
BILIRUB SERPL-MCNC: 0.9 MG/DL (ref 0.2–1.1)
BUN BLD-MCNC: 24 MG/DL (ref 9–23)
CALCIUM BLD-MCNC: 10.5 MG/DL (ref 8.7–10.4)
CHLORIDE SERPL-SCNC: 107 MMOL/L (ref 98–112)
CHOLEST SERPL-MCNC: 123 MG/DL (ref ?–200)
CO2 SERPL-SCNC: 26 MMOL/L (ref 21–32)
CREAT BLD-MCNC: 1.63 MG/DL
EGFRCR SERPLBLD CKD-EPI 2021: 42 ML/MIN/1.73M2 (ref 60–?)
EOSINOPHIL # BLD AUTO: 0.26 X10(3) UL (ref 0–0.7)
EOSINOPHIL NFR BLD AUTO: 3.8 %
ERYTHROCYTE [DISTWIDTH] IN BLOOD BY AUTOMATED COUNT: 12.6 %
EST. AVERAGE GLUCOSE BLD GHB EST-MCNC: 143 MG/DL (ref 68–126)
FASTING PATIENT LIPID ANSWER: YES
FASTING STATUS PATIENT QL REPORTED: YES
GLOBULIN PLAS-MCNC: 3.1 G/DL (ref 2–3.5)
GLUCOSE BLD-MCNC: 129 MG/DL (ref 70–99)
HBA1C MFR BLD: 6.6 % (ref ?–5.7)
HCT VFR BLD AUTO: 38 %
HDLC SERPL-MCNC: 31 MG/DL (ref 40–59)
HGB BLD-MCNC: 13.5 G/DL
IMM GRANULOCYTES # BLD AUTO: 0.04 X10(3) UL (ref 0–1)
IMM GRANULOCYTES NFR BLD: 0.6 %
LDLC SERPL CALC-MCNC: 65 MG/DL (ref ?–100)
LYMPHOCYTES # BLD AUTO: 1.25 X10(3) UL (ref 1–4)
LYMPHOCYTES NFR BLD AUTO: 18.4 %
MCH RBC QN AUTO: 33.7 PG (ref 26–34)
MCHC RBC AUTO-ENTMCNC: 35.5 G/DL (ref 31–37)
MCV RBC AUTO: 94.8 FL
MONOCYTES # BLD AUTO: 0.59 X10(3) UL (ref 0.1–1)
MONOCYTES NFR BLD AUTO: 8.7 %
NEUTROPHILS # BLD AUTO: 4.62 X10 (3) UL (ref 1.5–7.7)
NEUTROPHILS # BLD AUTO: 4.62 X10(3) UL (ref 1.5–7.7)
NEUTROPHILS NFR BLD AUTO: 68.2 %
NONHDLC SERPL-MCNC: 92 MG/DL (ref ?–130)
OSMOLALITY SERPL CALC.SUM OF ELEC: 296 MOSM/KG (ref 275–295)
PLATELET # BLD AUTO: 197 10(3)UL (ref 150–450)
POTASSIUM SERPL-SCNC: 4.7 MMOL/L (ref 3.5–5.1)
PROT SERPL-MCNC: 7.8 G/DL (ref 5.7–8.2)
RBC # BLD AUTO: 4.01 X10(6)UL
SODIUM SERPL-SCNC: 140 MMOL/L (ref 136–145)
TRIGL SERPL-MCNC: 154 MG/DL (ref 30–149)
TSI SER-ACNC: 4.15 MIU/ML (ref 0.55–4.78)
VLDLC SERPL CALC-MCNC: 23 MG/DL (ref 0–30)
WBC # BLD AUTO: 6.8 X10(3) UL (ref 4–11)

## 2024-10-17 PROCEDURE — 85025 COMPLETE CBC W/AUTO DIFF WBC: CPT | Performed by: FAMILY MEDICINE

## 2024-10-17 PROCEDURE — 36415 COLL VENOUS BLD VENIPUNCTURE: CPT | Performed by: FAMILY MEDICINE

## 2024-10-17 PROCEDURE — 84443 ASSAY THYROID STIM HORMONE: CPT | Performed by: FAMILY MEDICINE

## 2024-10-17 PROCEDURE — 80061 LIPID PANEL: CPT | Performed by: FAMILY MEDICINE

## 2024-10-17 PROCEDURE — 80053 COMPREHEN METABOLIC PANEL: CPT | Performed by: FAMILY MEDICINE

## 2024-10-17 PROCEDURE — 83036 HEMOGLOBIN GLYCOSYLATED A1C: CPT | Performed by: FAMILY MEDICINE

## 2024-12-27 DIAGNOSIS — N40.1 BENIGN PROSTATIC HYPERPLASIA WITH INCOMPLETE BLADDER EMPTYING: ICD-10-CM

## 2024-12-27 DIAGNOSIS — R39.14 BENIGN PROSTATIC HYPERPLASIA WITH INCOMPLETE BLADDER EMPTYING: ICD-10-CM

## 2024-12-27 RX ORDER — TAMSULOSIN HYDROCHLORIDE 0.4 MG/1
0.4 CAPSULE ORAL 2 TIMES DAILY
Qty: 180 CAPSULE | Refills: 4 | OUTPATIENT
Start: 2024-12-27

## 2025-01-02 ENCOUNTER — TELEPHONE (OUTPATIENT)
Dept: FAMILY MEDICINE CLINIC | Facility: CLINIC | Age: 85
End: 2025-01-02

## 2025-01-02 DIAGNOSIS — N40.1 BENIGN PROSTATIC HYPERPLASIA WITH INCOMPLETE BLADDER EMPTYING: ICD-10-CM

## 2025-01-02 DIAGNOSIS — R39.14 BENIGN PROSTATIC HYPERPLASIA WITH INCOMPLETE BLADDER EMPTYING: ICD-10-CM

## 2025-01-02 RX ORDER — TAMSULOSIN HYDROCHLORIDE 0.4 MG/1
0.4 CAPSULE ORAL 2 TIMES DAILY
Qty: 180 CAPSULE | Refills: 3 | Status: SHIPPED | OUTPATIENT
Start: 2025-01-02

## 2025-01-02 NOTE — TELEPHONE ENCOUNTER
Carmen is calling stating they never got the refill sent on 9/12/24 please send again for the Tamsulosin 0.4 mg.

## 2025-02-03 DIAGNOSIS — I10 ESSENTIAL HYPERTENSION: ICD-10-CM

## 2025-02-05 RX ORDER — LOSARTAN POTASSIUM 25 MG/1
25 TABLET ORAL DAILY
Qty: 90 TABLET | Refills: 4 | Status: SHIPPED | OUTPATIENT
Start: 2025-02-05

## 2025-02-05 NOTE — TELEPHONE ENCOUNTER
Requested Prescriptions     Pending Prescriptions Disp Refills    LOSARTAN 25 MG Oral Tab [Pharmacy Med Name: LOSARTAN 25MG TABLETS] 90 tablet 4     Sig: TAKE 1 TABLET(25 MG) BY MOUTH DAILY     LOV 8/30/2024     Patient was asked to follow-up in: 2 months    Appointment scheduled: 3/21/2025 Jason Mantilla MD     Medication failed protocol.    Routed to front staff     Patient is almost due for their annual physical please call patient and have them schedule an appt

## 2025-02-15 ENCOUNTER — TELEPHONE (OUTPATIENT)
Dept: FAMILY MEDICINE CLINIC | Facility: CLINIC | Age: 85
End: 2025-02-15

## 2025-02-15 DIAGNOSIS — N18.31 STAGE 3A CHRONIC KIDNEY DISEASE (CKD) (HCC): ICD-10-CM

## 2025-02-15 DIAGNOSIS — Z79.899 LONG-TERM USE OF HIGH-RISK MEDICATION: ICD-10-CM

## 2025-02-15 DIAGNOSIS — N18.32 STAGE 3B CHRONIC KIDNEY DISEASE (CKD) (HCC): ICD-10-CM

## 2025-02-15 DIAGNOSIS — Z00.00 LABORATORY EXAMINATION ORDERED AS PART OF A ROUTINE GENERAL MEDICAL EXAMINATION: ICD-10-CM

## 2025-02-15 DIAGNOSIS — E11.65 TYPE 2 DIABETES MELLITUS WITH HYPERGLYCEMIA, WITHOUT LONG-TERM CURRENT USE OF INSULIN (HCC): Primary | ICD-10-CM

## 2025-02-15 DIAGNOSIS — Z13.1 SCREENING FOR DIABETES MELLITUS: ICD-10-CM

## 2025-02-15 DIAGNOSIS — Z13.0 SCREENING FOR IRON DEFICIENCY ANEMIA: ICD-10-CM

## 2025-02-15 DIAGNOSIS — E78.5 DYSLIPIDEMIA: ICD-10-CM

## 2025-02-15 NOTE — TELEPHONE ENCOUNTER
Please enter lab orders for the patient's upcoming physical appointment.     Physical scheduled:   Your appointments       Date & Time Appointment Department ([place])    Mar 21, 2025 2:00 PM CDT Physical - Established with Jason Mantilla MD [facility] ([facility])              [facility]  [address]  [facility] [id number] [phone]           Preferred lab: [facility]     The patient has been notified to complete fasting labs prior to their physical appointment.

## 2025-02-16 NOTE — TELEPHONE ENCOUNTER
1. Type 2 diabetes mellitus with hyperglycemia, without long-term current use of insulin (HCC) (Primary)  Overview:  Fasting blood sugar in the 1/21/1939 range with an A1c of 8.1% in March 2013 before CABG, A1c 6.9% 2/2017, no meds  Metformin  daily and glipizide ER 10 increased 3/1/2021,  started 8.2019, and 2.2020  CKD 3a   Orders:  -     Comp Metabolic Panel (14)  -     Lipid Panel  -     Hemoglobin A1C  -     CBC With Differential With Platelet  2. Stage 3a chronic kidney disease (CKD) (HCC)  -     Microalb/Creat Ratio, Random Urine  3. Screening for diabetes mellitus  -     Comp Metabolic Panel (14)  -     Hemoglobin A1C  4. Screening for iron deficiency anemia  -     CBC With Differential With Platelet  5. Long-term use of high-risk medication  -     Comp Metabolic Panel (14)  -     CBC With Differential With Platelet  6. Laboratory examination ordered as part of a routine general medical examination  -     Comp Metabolic Panel (14)  -     Lipid Panel  -     Hemoglobin A1C  -     CBC With Differential With Platelet  7. Dyslipidemia  -     Comp Metabolic Panel (14)  -     Lipid Panel  8. Stage 3b chronic kidney disease (CKD) (HCC)  -     Comp Metabolic Panel (14)  -     CBC With Differential With Platelet       OK to notify. Thanks, Charles Mantilla MD There are no diagnoses linked to this encounter.     OK to notify. Thanks, Charles Mantilla MD

## 2025-03-05 DIAGNOSIS — E11.65 TYPE 2 DIABETES MELLITUS WITH HYPERGLYCEMIA, WITHOUT LONG-TERM CURRENT USE OF INSULIN (HCC): ICD-10-CM

## 2025-03-06 RX ORDER — METFORMIN HYDROCHLORIDE 500 MG/1
500 TABLET, EXTENDED RELEASE ORAL
Qty: 90 TABLET | Refills: 3 | Status: SHIPPED | OUTPATIENT
Start: 2025-03-06

## 2025-03-06 NOTE — TELEPHONE ENCOUNTER
Follow up of esrd  Here with an infection in the cuticle area. On abx for 3 days. Had I and d today    Visit Vitals  /53 (BP Location: RFA - Right forearm, Patient Position: Semi-Vásquez's)   Pulse 70   Temp 98.6 °F (37 °C) (Oral)   Resp 18   Ht 4' 11\" (1.499 m)   Wt 104.2 kg (229 lb 11.5 oz)   SpO2 93%   BMI 46.40 kg/m²     Lungs clear  2-3+ le edema (chronic_)  Alkaline Phosphatase (Units/L)   Date Value   09/02/2023 119 (H)     WBC (K/mcL)   Date Value   09/05/2023 7.9     RBC (mil/mcL)   Date Value   09/05/2023 2.99 (L)     HCT (%)   Date Value   09/05/2023 29.7 (L)     HGB (g/dL)   Date Value   09/05/2023 9.3 (L)     PLT (K/mcL)   Date Value   09/05/2023 148     IRON (mcg/dL)   Date Value   11/20/2019 29 (L)     Glucose (mg/dL)   Date Value   09/05/2023 95   09/04/2023 97     Sodium (mmol/L)   Date Value   09/05/2023 139   09/04/2023 135     Potassium (mmol/L)   Date Value   09/05/2023 4.1   09/04/2023 4.5     Chloride (mmol/L)   Date Value   09/05/2023 100   09/04/2023 95 (L)     Carbon Dioxide (mmol/L)   Date Value   09/05/2023 29   09/04/2023 33 (H)     BUN (mg/dL)   Date Value   09/05/2023 49 (H)   09/04/2023 70 (H)     Creatinine (mg/dL)   Date Value   09/05/2023 4.14 (H)   09/04/2023 6.46 (H)     Calcium (mg/dL)   Date Value   09/05/2023 9.4   09/04/2023 9.4     GFR Estimate,  (no units)   Date Value   01/24/2020 63   01/20/2020 56                Imp: esrd, hd tomorrow  If still here, she was hoping to be discharged today  If more abx needed suspect po will be ok as an outpatient  Chronic fluid overload/hypotension due to right heart failure   Requested Prescriptions     Signed Prescriptions Disp Refills    METFORMIN  MG Oral Tablet 24 Hr 90 tablet 3     Sig: TAKE 1 TABLET(500 MG) BY MOUTH DAILY WITH BREAKFAST     Authorizing Provider: DIANNE APARICIO     Ordering User: MENA FOX      Refilled per protocol/OV notes

## 2025-03-19 NOTE — PROGRESS NOTES
Bekah Lugo is a 67year old male who presents for a complete physical exam.   HPI:   Patient presents with:  Physical      His last annual assessment has been over 1 year: Annual Physical due on 09/27/1943       Pt complains of Diabetes Mellitus stable bu CREATSERUM 1.14 02/15/2019 08:28 AM    GFR 62 09/05/2016 10:33 AM    CA 8.9 02/15/2019 08:28 AM    ALB 4.0 02/15/2019 08:28 AM    TP 7.5 02/15/2019 08:28 AM    ALKPHO 55 02/15/2019 08:28 AM    AST 19 02/15/2019 08:28 AM    ALT 24 02/15/2019 08:28 AM    VIC that he does not drink alcohol or use drugs. Exercise: minimal.  Diet: doesn't watch    REVIEW OF SYSTEMS:   A comprehensive 14 point review of systems was completed. Pertinent positives and negatives noted in the the HPI.  Specifically:  GEN:  No fever present. Pulmonary/Chest: Effort normal and breath sounds normal. No respiratory distress. He has no decreased breath sounds. He has no wheezes. He has no rales. He exhibits no tenderness. Abdominal: Soft.  Bowel sounds are normal. He exhibits no distens understanding of these issues and agrees to the plan. The patient is asked to return for CPX in 1 years.     Assessment:  Problem List Items Addressed This Visit        Cardiovascular    Atherosclerosis of native coronary artery of native heart without ang (around 8/28/2020) for diabetes follow up.     Paige Parish MD, 2/28/2020, 2:42 PM no

## 2025-03-20 NOTE — ASSESSMENT & PLAN NOTE
Diabetes: A1c is 6.6 done 10/17/2024 which shows Excellent control. Continue current meds and lifestyle modification.   DM Meds: glipiZIDE ER Tb24 - 10 MG; metFORMIN ER Tb24 - 500 MG   Oral Diabetes Med Adherence Good at 97%    Diabetic Complications: Hypertriglyceridemia: 154, 10/17/2024.  CKD 3b (GFR 42).   Cataract history  CAD    Diabetes is : stable Continue current treatment regimen. Reassess Diabetes in : in 6 months     Orders:    OneTouch Verio; 1 strip by In Vitro route in the morning, at noon, and at bedtime.  Dispense: 300 strip; Refill: 3

## 2025-03-20 NOTE — ASSESSMENT & PLAN NOTE
CAD managed by [facility].CABG hx. Stable, continue present management and continue to monitor for progression

## 2025-03-20 NOTE — ASSESSMENT & PLAN NOTE
Thyroid shows Good control.   10/17/2024: TSH 4.153 reasonably well controlled current treatment plan effective, no change in therapy

## 2025-03-20 NOTE — ASSESSMENT & PLAN NOTE
BP shows good control with last BP of 126/66. Continue lifestyle changes, diet, exercise and weight loss.   10/17/2024: Potassium 4.7; Creatinine 1.63 (H); eGFR-Cr 42 (L)  BP Meds: losartan Tabs - 25 MG   ACE/ARB Adherence Good at 98%

## 2025-03-20 NOTE — ASSESSMENT & PLAN NOTE
Cholesterol shows Good control. Long term heart-healthy diet and lifestyle discussed and encouraged to reduce risk of cardiovascular disease.  10/17/2024: Cholesterol, Total 123; HDL Cholesterol 31 (L); Triglycerides 154 (H); LDL Cholesterol 65  Cholesterol Meds: atorvastatin Tabs - 10 MG  stable  Continue with current treatment plan

## 2025-03-20 NOTE — ASSESSMENT & PLAN NOTE
Seen in 2018. Dr Winter at [facility] managing. Continue to monitor and continue present management

## 2025-03-20 NOTE — ASSESSMENT & PLAN NOTE
Last Glomerular Filtration Rate: CKD 3b (GFR 42). .   10/17/2024: Creatinine 1.63 (H) CKD etiologies : Diabetes and Hypertension  Plan/Management: Control Hypertension/Diabetes

## 2025-03-21 ENCOUNTER — OFFICE VISIT (OUTPATIENT)
Dept: FAMILY MEDICINE CLINIC | Facility: CLINIC | Age: 85
End: 2025-03-21
Payer: MEDICAID

## 2025-03-21 VITALS
OXYGEN SATURATION: 98 % | HEART RATE: 74 BPM | SYSTOLIC BLOOD PRESSURE: 126 MMHG | HEIGHT: 67 IN | RESPIRATION RATE: 14 BRPM | BODY MASS INDEX: 29.72 KG/M2 | DIASTOLIC BLOOD PRESSURE: 66 MMHG | WEIGHT: 189.38 LBS

## 2025-03-21 DIAGNOSIS — E03.8 SUBCLINICAL HYPOTHYROIDISM: ICD-10-CM

## 2025-03-21 DIAGNOSIS — I35.8 AORTIC VALVE SCLEROSIS: ICD-10-CM

## 2025-03-21 DIAGNOSIS — I77.810 DILATATION OF THORACIC AORTA: ICD-10-CM

## 2025-03-21 DIAGNOSIS — I35.1 NONRHEUMATIC AORTIC VALVE INSUFFICIENCY: ICD-10-CM

## 2025-03-21 DIAGNOSIS — I25.10 ATHEROSCLEROSIS OF NATIVE CORONARY ARTERY OF NATIVE HEART WITHOUT ANGINA PECTORIS: ICD-10-CM

## 2025-03-21 DIAGNOSIS — Z00.00 ANNUAL PHYSICAL EXAM: Primary | ICD-10-CM

## 2025-03-21 DIAGNOSIS — I10 ESSENTIAL HYPERTENSION: ICD-10-CM

## 2025-03-21 DIAGNOSIS — E11.65 TYPE 2 DIABETES MELLITUS WITH HYPERGLYCEMIA, WITHOUT LONG-TERM CURRENT USE OF INSULIN (HCC): ICD-10-CM

## 2025-03-21 DIAGNOSIS — N18.31 CHRONIC KIDNEY DISEASE, STAGE 3A (HCC): ICD-10-CM

## 2025-03-21 DIAGNOSIS — E78.2 MIXED HYPERLIPIDEMIA: ICD-10-CM

## 2025-03-21 PROCEDURE — 99397 PER PM REEVAL EST PAT 65+ YR: CPT | Performed by: FAMILY MEDICINE

## 2025-03-21 RX ORDER — BLOOD SUGAR DIAGNOSTIC
1 STRIP MISCELLANEOUS 3 TIMES DAILY
Qty: 300 STRIP | Refills: 3 | Status: SHIPPED | OUTPATIENT
Start: 2025-03-21

## 2025-03-21 NOTE — PROGRESS NOTES
The following individual(s) verbally consented to be recorded using ambient AI listening technology and understand that they can each withdraw their consent to this listening technology at any point by asking the clinician to turn off or pause the recording:    Patient name: Andrae Kaitlynn  Additional names:  patti valle

## 2025-03-21 NOTE — PROGRESS NOTES
Andrae Calderón is a 84 year old male who presents for a complete physical exam.     had no chief complaint listed for this encounter.   No topic due editable text      Subjective:    History of Present Illness  Andrae Calderón is an 84 year old male who presents for an annual physical exam. He is accompanied by his daughter.    He has a history of type 2 diabetes mellitus, managed with glipizide, metformin, atorvastatin, and losartan. His last A1c was 6.6% in October, with previous fluctuations noted, including a 7.5% in March. He monitors his blood glucose levels at home, with fasting blood sugars typically in the 80s to 90s. He tests his blood sugar at least twice a day, although insurance only covers two to three test strips per day.    He has a history of aortic sclerosis and mild aortic regurgitation. An echocardiogram from 2022 showed an ejection fraction of 60-65% and an aortic root measurement of 4.2 cm, consistent with previous findings. His daughter recalls a recent echocardiogram, but the exact date is unclear. He also has mild concentric left ventricular hypertrophy.    His daughter notes that he is experiencing slower reactions, particularly in understanding and following directions, which he attributes to aging. He is undergoing routine health maintenance as part of his annual physical examination.     He complains of dogin well. Last A1c value was 6.6% done 10/17/2024. .     Tobacco:  He smoked tobacco in the past but quit greater than 12 months ago.  Social History     Tobacco Use   Smoking Status Former    Current packs/day: 0.00    Types: Cigarettes    Quit date: 3/6/1974    Years since quittin.0   Smokeless Tobacco Never         Wt Readings from Last 4 Encounters:   25 189 lb 6.4 oz (85.9 kg)   24 184 lb (83.5 kg)   03/15/24 185 lb (83.9 kg)   23 185 lb 9.6 oz (84.2 kg)     Body mass index is 29.66 kg/m².     The ASCVD Risk score (Kandace TEMPLE, et al., 2019) failed to  calculate for the following reasons:    The 2019 ASCVD risk score is only valid for ages 40 to 79    No Further Nursing Notes to Review  Tobacco Reviewed  Allergies   Reviewed  Medications Reviewed  Problem List Reviewed  Medical History   Reviewed  Surgical History Reviewed  Family History Reviewed          His family history includes Stroke (age of onset: 69) in his father; Stroke (age of onset: 73) in his mother.   He  reports that he quit smoking about 51 years ago. His smoking use included cigarettes. He has never used smokeless tobacco. He reports that he does not drink alcohol and does not use drugs.    Exercise: three times per week.  Diet: watches minimally    There are no preventive care reminders to display for this patient.   No results found for this or any previous visit.     No recommendations at this time   No recommendations at this time   Health Maintenance Due   Topic Date Due    Diabetes Care Dilated Eye Exam  01/19/2023    Zoster Vaccines (2 of 2) 11/01/2023    COVID-19 Vaccine (6 - 2024-25 season) 09/01/2024    Influenza Vaccine (1) 10/01/2024    Fall Risk Screening (Annual)  01/01/2025    Diabetes Care: Foot Exam (Annual)  Never done    Diabetes Care: Microalb/Creat Ratio (Annual)  01/01/2025    Annual Physical  03/15/2025    Diabetes Care A1C  04/17/2025         Review of Systems   Constitutional: Negative.  Negative for activity change, appetite change, chills and fever.   HENT: Negative.     Eyes: Negative.    Respiratory: Negative.  Negative for shortness of breath.    Cardiovascular: Negative.  Negative for chest pain and palpitations.   Gastrointestinal: Negative.  Negative for abdominal pain.   Genitourinary: Negative.  Negative for dysuria.   Musculoskeletal:  Negative for arthralgias.   Skin: Negative.  Negative for rash.   Allergic/Immunologic: Negative.    Neurological: Negative.         Results:    Lab Results   Component Value Date/Time    WBC 6.8 10/17/2024 08:47 AM     HGB 13.5 10/17/2024 08:47 AM    .0 10/17/2024 08:47 AM      Lab Results   Component Value Date/Time     (H) 10/17/2024 08:47 AM     10/17/2024 08:47 AM    K 4.7 10/17/2024 08:47 AM     10/17/2024 08:47 AM    CO2 26.0 10/17/2024 08:47 AM    CREATSERUM 1.63 (H) 10/17/2024 08:47 AM    CA 10.5 (H) 10/17/2024 08:47 AM    ALB 4.7 10/17/2024 08:47 AM    TP 7.8 10/17/2024 08:47 AM    ALKPHO 58 10/17/2024 08:47 AM    AST 21 10/17/2024 08:47 AM    ALT 17 10/17/2024 08:47 AM    BILT 0.9 10/17/2024 08:47 AM    TSH 4.153 10/17/2024 08:47 AM        Lab Results   Component Value Date/Time    CHOLEST 123 10/17/2024 08:47 AM    HDL 31 (L) 10/17/2024 08:47 AM    TRIG 154 (H) 10/17/2024 08:47 AM    LDL 65 10/17/2024 08:47 AM    NONHDLC 92 10/17/2024 08:47 AM       Last A1c value was 6.6% done 10/17/2024.     No results found for requested labs within last 1833 days 8 hours.      Results  LABS  A1c: 6.6% (10/2024)  A1c: 7.5% (03/2024)  A1c: 7.4%  Fasting glucose: 80-90 mg/dL (2025)    DIAGNOSTIC  Echocardiogram: Ejection fraction 60-65%, aortic root 4.2 cm, mild aortic sclerosis (11/2022)  Echocardiogram: Mild concentric left ventricular hypertrophy, ejection fraction 65-70%, aortic root 4.2 cm, mild aortic regurgitation (11/20/2024)     Objective:    EXAM:  /66   Pulse 74   Resp 14   Ht 5' 7\" (1.702 m)   Wt 189 lb 6.4 oz (85.9 kg)   SpO2 98%   BMI 29.66 kg/m²  Estimated body mass index is 29.66 kg/m² as calculated from the following:    Height as of this encounter: 5' 7\" (1.702 m).    Weight as of this encounter: 189 lb 6.4 oz (85.9 kg).   Physical Exam  Vitals and nursing note reviewed.   Constitutional:       General: He is not in acute distress.     Appearance: Normal appearance.   HENT:      Head: Normocephalic and atraumatic.      Right Ear: Tympanic membrane and external ear normal.      Left Ear: Tympanic membrane and external ear normal.      Nose: Nose normal.      Mouth/Throat:       Mouth: Mucous membranes are moist.   Eyes:      Extraocular Movements: Extraocular movements intact.      Pupils: Pupils are equal, round, and reactive to light.   Cardiovascular:      Rate and Rhythm: Normal rate and regular rhythm.      Pulses: Normal pulses.           Carotid pulses are 2+ on the right side and 2+ on the left side.       Radial pulses are 2+ on the right side and 2+ on the left side.        Dorsalis pedis pulses are 2+ on the right side and 2+ on the left side.        Posterior tibial pulses are 2+ on the right side and 2+ on the left side.      Heart sounds: Normal heart sounds, S1 normal and S2 normal. No murmur heard.  Pulmonary:      Effort: Pulmonary effort is normal. No respiratory distress.      Breath sounds: Normal breath sounds. No wheezing.   Abdominal:      General: Abdomen is flat. Bowel sounds are normal. There is no distension.      Palpations: Abdomen is soft.      Tenderness: There is no abdominal tenderness.   Musculoskeletal:         General: Normal range of motion.      Cervical back: Normal range of motion and neck supple.      Right lower leg: No edema.      Left lower leg: No edema.      Right foot: No deformity.      Left foot: No deformity.   Feet:      Right foot:      Protective Sensation: 6 sites tested.  6 sites sensed.      Skin integrity: Skin integrity normal. No ulcer.      Left foot:      Protective Sensation: 6 sites tested.  6 sites sensed.      Skin integrity: Skin integrity normal. No ulcer.   Skin:     General: Skin is warm and dry.      Capillary Refill: Capillary refill takes less than 2 seconds.      Findings: No rash.   Neurological:      General: No focal deficit present.      Mental Status: He is alert and oriented to person, place, and time.   Psychiatric:         Mood and Affect: Mood normal.         Behavior: Behavior normal.         Thought Content: Thought content normal.         Judgment: Judgment normal.        Physical Exam  CHEST: Lungs clear  to auscultation bilaterally.  CARDIOVASCULAR: Systolic murmur auscultated. Aortic root 4.2 cm, mild aortic sclerosis.   Bilateral barefoot skin diabetic exam is normal, visualized feet and the appearance is normal.  Bilateral monofilament/sensation of both feet is normal.  Pulsation pedal pulse exam of both lower legs/feet is normal as well.         Assessment & Plan:    Andrae Calderón is a 84 year old male who presents for a complete physical exam.   Pt's weight is Body mass index is 29.66 kg/m²., recommended low fat diet and aerobic exercise 30 minutes three times weekly.   Health maintenance, Up to date    Immunizations: Up to date   Immunization History   Administered Date(s) Administered    Covid-19 Vaccine Pfizer 30 mcg/0.3 ml 02/03/2021, 02/24/2021, 03/17/2021, 11/03/2021    Covid-19 Vaccine Pfizer Bernard-Sucrose 30 mcg/0.3 ml 06/02/2022    FLU VAC High Dose 65 YRS & Older PRSV Free (24924) 09/23/2019, 09/03/2020, 09/22/2021, 09/14/2022, 09/06/2023    FLUAD High Dose 65 yr and older (48147) 11/15/2018    Fluzone Vaccine Medicare () 09/04/2016, 09/09/2017, 09/27/2019    High Dose Fluzone Influenza Vaccine, 65yr+ PF 0.5mL (99903) 09/08/2020    Influenza 09/13/2015, 10/01/2017    Pneumococcal Conjugate PCV20 03/03/2023    Pneumovax 23 02/14/2018    TDAP 09/05/2016    Zoster Vaccine Recombinant Adjuvanted (Shingrix) 09/06/2023         Pt info given for: exercise, low fat diet, The patient indicates understanding of these issues and agrees to the plan.  The patient is asked to return for CPX in 1 years.    Assessment & Plan  Annual physical exam         Dilatation of thoracic aorta  Seen in 2019. 4.2 cm, stable per CV managment         Type 2 diabetes mellitus with hyperglycemia, without long-term current use of insulin (HCC)  Diabetes: A1c is 6.6 done 10/17/2024 which shows Excellent control. Continue current meds and lifestyle modification.   DM Meds: glipiZIDE ER Tb24 - 10 MG; metFORMIN ER Tb24 - 500 MG   Oral  Diabetes Med Adherence Good at 97%    Diabetic Complications: Hypertriglyceridemia: 154, 10/17/2024.  CKD 3b (GFR 42).   Cataract history  CAD    Diabetes is : stable Continue current treatment regimen. Reassess Diabetes in : in 6 months     Orders:    OneTouch Verio; 1 strip by In Vitro route in the morning, at noon, and at bedtime.  Dispense: 300 strip; Refill: 3    Chronic kidney disease, stage 3a (HCC)  Last Glomerular Filtration Rate: CKD 3b (GFR 42). .   10/17/2024: Creatinine 1.63 (H) CKD etiologies : Diabetes and Hypertension  Plan/Management: Control Hypertension/Diabetes          Nonrheumatic aortic valve insufficiency  Seen in 2018. Dr Winter at [facility] managing. Continue to monitor and continue present management          Atherosclerosis of native coronary artery of native heart without angina pectoris  CAD managed by [facility].CABG hx. Stable, continue present management and continue to monitor for progression          Essential hypertension  BP shows good control with last BP of 126/66. Continue lifestyle changes, diet, exercise and weight loss.   10/17/2024: Potassium 4.7; Creatinine 1.63 (H); eGFR-Cr 42 (L)  BP Meds: losartan Tabs - 25 MG   ACE/ARB Adherence Good at 98%           Mixed hyperlipidemia  Cholesterol shows Good control. Long term heart-healthy diet and lifestyle discussed and encouraged to reduce risk of cardiovascular disease.  10/17/2024: Cholesterol, Total 123; HDL Cholesterol 31 (L); Triglycerides 154 (H); LDL Cholesterol 65  Cholesterol Meds: atorvastatin Tabs - 10 MG  stable  Continue with current treatment plan          Subclinical hypothyroidism  Thyroid shows Good control.   10/17/2024: TSH 4.153 reasonably well controlled current treatment plan effective, no change in therapy          Aortic valve sclerosis  Low level, echo from November 2024 was reviewed and stable          Assessment & Plan  Type 2 diabetes mellitus with hyperglycemia  Diabetes well-controlled with A1c of 6.6%.  Fasting glucose 80-90 mg/dL. Medications well-tolerated.  - Renew prescription for glucose test strips with prior authorization for two or three tests per day.  - Continue Metformin  mg oral daily.  - Continue Glipizide ER 10 mg oral daily.  - Schedule recheck of diabetes in six months.    Aortic sclerosis with mild aortic regurgitation  Mild murmur consistent with previous findings. Echocardiogram shows mild concentric LVH, EF 65-70%, aortic root 4.2 cm. No current concerns.  - No immediate intervention required.  - Monitor for any changes in symptoms or condition.    General Health Maintenance  Routine health maintenance includes eye exam and foot check.  - Schedule eye exam.  - Perform foot check during the visit.     I have changed Andrae Calderón's OneTouch Verio. I am also having him maintain his aspirin, OneTouch Verio IQ System, OneTouch Delica Lancing Dev, atorvastatin, oxybutynin ER, finasteride, glipiZIDE ER, tamsulosin, losartan, and metFORMIN ER.     Return in about 6 months (around 9/21/2025) for recheck.

## 2025-03-23 LAB
ABSOLUTE BASOPHILS: 11 CELLS/UL (ref 0–200)
ABSOLUTE EOSINOPHILS: 262 CELLS/UL (ref 15–500)
ABSOLUTE LYMPHOCYTES: 1077 CELLS/UL (ref 850–3900)
ABSOLUTE MONOCYTES: 507 CELLS/UL (ref 200–950)
ABSOLUTE NEUTROPHILS: 3842 CELLS/UL (ref 1500–7800)
ALBUMIN/GLOBULIN RATIO: 1.4 (CALC) (ref 1–2.5)
ALBUMIN: 3.9 G/DL (ref 3.6–5.1)
ALKALINE PHOSPHATASE: 45 U/L (ref 35–144)
ALT: 13 U/L (ref 9–46)
AST: 17 U/L (ref 10–35)
BASOPHILS: 0.2 %
BILIRUBIN, TOTAL: 0.6 MG/DL (ref 0.2–1.2)
BUN/CREATININE RATIO: 16 (CALC) (ref 6–22)
BUN: 24 MG/DL (ref 7–25)
CALCIUM: 9.5 MG/DL (ref 8.6–10.3)
CARBON DIOXIDE: 21 MMOL/L (ref 20–32)
CHLORIDE: 110 MMOL/L (ref 98–110)
CHOL/HDLC RATIO: 3.9 (CALC)
CHOLESTEROL, TOTAL: 117 MG/DL
CREATININE: 1.47 MG/DL (ref 0.7–1.22)
EGFR: 47 ML/MIN/1.73M2
EOSINOPHILS: 4.6 %
GLOBULIN: 2.7 G/DL (CALC) (ref 1.9–3.7)
GLUCOSE: 81 MG/DL (ref 65–99)
HDL CHOLESTEROL: 30 MG/DL
HEMATOCRIT: 41.1 % (ref 38.5–50)
HEMOGLOBIN A1C: 7.1 % OF TOTAL HGB
HEMOGLOBIN: 13.4 G/DL (ref 13.2–17.1)
LDL-CHOLESTEROL: 63 MG/DL (CALC)
LYMPHOCYTES: 18.9 %
MCH: 33.4 PG (ref 27–33)
MCHC: 32.6 G/DL (ref 32–36)
MCV: 102.5 FL (ref 80–100)
MONOCYTES: 8.9 %
MPV: 9.7 FL (ref 7.5–12.5)
NEUTROPHILS: 67.4 %
NON-HDL CHOLESTEROL: 87 MG/DL (CALC)
PLATELET COUNT: 184 THOUSAND/UL (ref 140–400)
POTASSIUM: 4.5 MMOL/L (ref 3.5–5.3)
PROTEIN, TOTAL: 6.6 G/DL (ref 6.1–8.1)
RDW: 13.1 % (ref 11–15)
RED BLOOD CELL COUNT: 4.01 MILLION/UL (ref 4.2–5.8)
SODIUM: 140 MMOL/L (ref 135–146)
TRIGLYCERIDES: 158 MG/DL
WHITE BLOOD CELL COUNT: 5.7 THOUSAND/UL (ref 3.8–10.8)

## 2025-03-25 LAB
CREATININE, RANDOM URINE: 88 MG/DL (ref 20–320)
MICROALBUMIN/CREATININE RATIO, RANDOM URINE: 6 MG/G CREAT
MICROALBUMIN: 0.5 MG/DL

## 2025-04-21 DIAGNOSIS — E78.2 MIXED HYPERLIPIDEMIA: ICD-10-CM

## 2025-04-21 RX ORDER — ATORVASTATIN CALCIUM 10 MG/1
10 TABLET, FILM COATED ORAL DAILY
Qty: 90 TABLET | Refills: 3 | Status: SHIPPED | OUTPATIENT
Start: 2025-04-21

## 2025-04-21 NOTE — TELEPHONE ENCOUNTER
Requested Prescriptions     Signed Prescriptions Disp Refills    ATORVASTATIN 10 MG Oral Tab 90 tablet 3     Sig: TAKE 1 TABLET(10 MG) BY MOUTH DAILY     Authorizing Provider: DIANNE APARICIO     Ordering User: MENA FOX      Refilled per protocol/OV notes

## 2025-06-03 DIAGNOSIS — R39.15 BENIGN PROSTATIC HYPERPLASIA (BPH) WITH URINARY URGENCY: ICD-10-CM

## 2025-06-03 DIAGNOSIS — N40.1 BENIGN PROSTATIC HYPERPLASIA (BPH) WITH URINARY URGENCY: ICD-10-CM

## 2025-06-03 RX ORDER — OXYBUTYNIN CHLORIDE 10 MG/1
10 TABLET, EXTENDED RELEASE ORAL DAILY
Qty: 90 TABLET | Refills: 4 | Status: SHIPPED | OUTPATIENT
Start: 2025-06-03

## 2025-06-04 NOTE — TELEPHONE ENCOUNTER
Not on 2022 protocol sheet    Requested Prescriptions     Pending Prescriptions Disp Refills    OXYBUTYNIN ER 10 MG Oral Tablet 24 Hr [Pharmacy Med Name: OXYBUTYNIN ER 10MG TABLETS] 90 tablet 3     Sig: TAKE 1 TABLET(10 MG) BY MOUTH DAILY        Last refill: 8/30/24 90 tabs 3 refills    Last Appointment: LOV 3/21/2025     Next Appointment: 9/19/2025 Jason Mantilla MD

## (undated) DIAGNOSIS — N40.1 BENIGN PROSTATIC HYPERPLASIA WITH INCOMPLETE BLADDER EMPTYING: ICD-10-CM

## (undated) DIAGNOSIS — R39.14 BENIGN PROSTATIC HYPERPLASIA WITH INCOMPLETE BLADDER EMPTYING: ICD-10-CM

## (undated) NOTE — LETTER
[address] [place]  [id number] [phone]          Date: 2022     Patient Name: Stacey Hooper   :   1940   Date of Surgery:   & 2022      Dear Dr Tonya Resendiz,    Thank you for referring Andrae to me for preoperative clearance. As you know he  is scheduled for bilateral cataract at [facility] eye [address] [place] [address] on  & 2022. He is medically stable and clear for surgery. Thank you for allowing me to continue to participate in Andrae's care.     Sincerely,            Allie Correia MD, 2022, 2:30 PM

## (undated) NOTE — LETTER
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